# Patient Record
Sex: FEMALE | Race: WHITE | NOT HISPANIC OR LATINO | Employment: FULL TIME | URBAN - METROPOLITAN AREA
[De-identification: names, ages, dates, MRNs, and addresses within clinical notes are randomized per-mention and may not be internally consistent; named-entity substitution may affect disease eponyms.]

---

## 2018-09-05 ENCOUNTER — HOSPITAL ENCOUNTER (OUTPATIENT)
Facility: HOSPITAL | Age: 68
Discharge: HOME | End: 2018-09-07
Attending: EMERGENCY MEDICINE | Admitting: EMERGENCY MEDICINE

## 2018-09-05 ENCOUNTER — APPOINTMENT (OUTPATIENT)
Dept: GENERAL RADIOLOGY | Facility: HOSPITAL | Age: 68
End: 2018-09-05

## 2018-09-05 DIAGNOSIS — R07.9 CHEST PAIN, UNSPECIFIED TYPE: Primary | ICD-10-CM

## 2018-09-05 LAB
ALBUMIN SERPL-MCNC: 3.9 G/DL (ref 3.5–5.2)
ALBUMIN/GLOB SERPL: 1.3 G/DL
ALP SERPL-CCNC: 62 U/L (ref 39–117)
ALT SERPL W P-5'-P-CCNC: 32 U/L (ref 1–33)
ANION GAP SERPL CALCULATED.3IONS-SCNC: 12.2 MMOL/L
AST SERPL-CCNC: 30 U/L (ref 1–32)
BASOPHILS # BLD AUTO: 0.03 10*3/MM3 (ref 0–0.2)
BASOPHILS NFR BLD AUTO: 0.6 % (ref 0–1.5)
BILIRUB SERPL-MCNC: 0.2 MG/DL (ref 0.1–1.2)
BUN BLD-MCNC: 11 MG/DL (ref 8–23)
BUN/CREAT SERPL: 13.9 (ref 7–25)
CALCIUM SPEC-SCNC: 9.3 MG/DL (ref 8.6–10.5)
CHLORIDE SERPL-SCNC: 103 MMOL/L (ref 98–107)
CO2 SERPL-SCNC: 25.8 MMOL/L (ref 22–29)
CREAT BLD-MCNC: 0.79 MG/DL (ref 0.57–1)
DEPRECATED RDW RBC AUTO: 45.1 FL (ref 37–54)
EOSINOPHIL # BLD AUTO: 0.16 10*3/MM3 (ref 0–0.7)
EOSINOPHIL NFR BLD AUTO: 3 % (ref 0.3–6.2)
ERYTHROCYTE [DISTWIDTH] IN BLOOD BY AUTOMATED COUNT: 12.8 % (ref 11.7–13)
GFR SERPL CREATININE-BSD FRML MDRD: 73 ML/MIN/1.73
GLOBULIN UR ELPH-MCNC: 3 GM/DL
GLUCOSE BLD-MCNC: 233 MG/DL (ref 65–99)
HCT VFR BLD AUTO: 41.3 % (ref 35.6–45.5)
HGB BLD-MCNC: 13.1 G/DL (ref 11.9–15.5)
IMM GRANULOCYTES # BLD: 0 10*3/MM3 (ref 0–0.03)
IMM GRANULOCYTES NFR BLD: 0 % (ref 0–0.5)
LIPASE SERPL-CCNC: 37 U/L (ref 13–60)
LYMPHOCYTES # BLD AUTO: 1.97 10*3/MM3 (ref 0.9–4.8)
LYMPHOCYTES NFR BLD AUTO: 37 % (ref 19.6–45.3)
MCH RBC QN AUTO: 30.8 PG (ref 26.9–32)
MCHC RBC AUTO-ENTMCNC: 31.7 G/DL (ref 32.4–36.3)
MCV RBC AUTO: 97.2 FL (ref 80.5–98.2)
MONOCYTES # BLD AUTO: 0.39 10*3/MM3 (ref 0.2–1.2)
MONOCYTES NFR BLD AUTO: 7.3 % (ref 5–12)
NEUTROPHILS # BLD AUTO: 2.78 10*3/MM3 (ref 1.9–8.1)
NEUTROPHILS NFR BLD AUTO: 52.1 % (ref 42.7–76)
PLATELET # BLD AUTO: 173 10*3/MM3 (ref 140–500)
PMV BLD AUTO: 10.8 FL (ref 6–12)
POTASSIUM BLD-SCNC: 4.1 MMOL/L (ref 3.5–5.2)
PROT SERPL-MCNC: 6.9 G/DL (ref 6–8.5)
RBC # BLD AUTO: 4.25 10*6/MM3 (ref 3.9–5.2)
SODIUM BLD-SCNC: 141 MMOL/L (ref 136–145)
TROPONIN T SERPL-MCNC: <0.01 NG/ML (ref 0–0.03)
WBC NRBC COR # BLD: 5.33 10*3/MM3 (ref 4.5–10.7)

## 2018-09-05 PROCEDURE — 80053 COMPREHEN METABOLIC PANEL: CPT | Performed by: PHYSICIAN ASSISTANT

## 2018-09-05 PROCEDURE — 93005 ELECTROCARDIOGRAM TRACING: CPT

## 2018-09-05 PROCEDURE — 84484 ASSAY OF TROPONIN QUANT: CPT | Performed by: PHYSICIAN ASSISTANT

## 2018-09-05 PROCEDURE — 83690 ASSAY OF LIPASE: CPT | Performed by: PHYSICIAN ASSISTANT

## 2018-09-05 PROCEDURE — 85025 COMPLETE CBC W/AUTO DIFF WBC: CPT | Performed by: PHYSICIAN ASSISTANT

## 2018-09-05 PROCEDURE — G0378 HOSPITAL OBSERVATION PER HR: HCPCS

## 2018-09-05 PROCEDURE — 71046 X-RAY EXAM CHEST 2 VIEWS: CPT

## 2018-09-05 PROCEDURE — 93005 ELECTROCARDIOGRAM TRACING: CPT | Performed by: EMERGENCY MEDICINE

## 2018-09-05 PROCEDURE — 99284 EMERGENCY DEPT VISIT MOD MDM: CPT

## 2018-09-05 RX ORDER — ASPIRIN 325 MG
325 TABLET, DELAYED RELEASE (ENTERIC COATED) ORAL DAILY
COMMUNITY

## 2018-09-05 RX ORDER — ATORVASTATIN CALCIUM 40 MG/1
40 TABLET, FILM COATED ORAL DAILY
COMMUNITY

## 2018-09-05 RX ORDER — GLIMEPIRIDE 4 MG/1
4 TABLET ORAL
Status: ON HOLD | COMMUNITY
End: 2018-09-06

## 2018-09-05 RX ORDER — CARVEDILOL 6.25 MG/1
6.25 TABLET ORAL DAILY
COMMUNITY

## 2018-09-05 RX ORDER — SODIUM CHLORIDE 0.9 % (FLUSH) 0.9 %
10 SYRINGE (ML) INJECTION AS NEEDED
Status: DISCONTINUED | OUTPATIENT
Start: 2018-09-05 | End: 2018-09-07 | Stop reason: HOSPADM

## 2018-09-06 ENCOUNTER — APPOINTMENT (OUTPATIENT)
Dept: NUCLEAR MEDICINE | Facility: HOSPITAL | Age: 68
End: 2018-09-06

## 2018-09-06 LAB
BH CV NUCLEAR PRIOR STUDY: 3
BH CV STRESS BP STAGE 1: NORMAL
BH CV STRESS BP STAGE 2: NORMAL
BH CV STRESS DURATION MIN STAGE 1: 3
BH CV STRESS DURATION MIN STAGE 2: 1
BH CV STRESS DURATION SEC STAGE 1: 0
BH CV STRESS DURATION SEC STAGE 2: 40
BH CV STRESS GRADE STAGE 1: 10
BH CV STRESS GRADE STAGE 2: 12
BH CV STRESS HR STAGE 1: 121
BH CV STRESS HR STAGE 2: 141
BH CV STRESS METS STAGE 1: 5
BH CV STRESS METS STAGE 2: 7.5
BH CV STRESS PROTOCOL 1: NORMAL
BH CV STRESS RECOVERY BP: NORMAL MMHG
BH CV STRESS RECOVERY HR: 83 BPM
BH CV STRESS SPEED STAGE 1: 1.7
BH CV STRESS SPEED STAGE 2: 2.5
BH CV STRESS STAGE 1: 1
BH CV STRESS STAGE 2: 2
CHOLEST SERPL-MCNC: 196 MG/DL (ref 0–200)
CK SERPL-CCNC: 103 U/L (ref 20–180)
DEPRECATED RDW RBC AUTO: 46.4 FL (ref 37–54)
ERYTHROCYTE [DISTWIDTH] IN BLOOD BY AUTOMATED COUNT: 13 % (ref 11.7–13)
GLUCOSE BLDC GLUCOMTR-MCNC: 159 MG/DL (ref 70–130)
GLUCOSE BLDC GLUCOMTR-MCNC: 180 MG/DL (ref 70–130)
GLUCOSE BLDC GLUCOMTR-MCNC: 234 MG/DL (ref 70–130)
HCT VFR BLD AUTO: 40.8 % (ref 35.6–45.5)
HDLC SERPL-MCNC: 63 MG/DL (ref 40–60)
HGB BLD-MCNC: 12.8 G/DL (ref 11.9–15.5)
LDLC SERPL CALC-MCNC: 120 MG/DL (ref 0–100)
LDLC/HDLC SERPL: 1.9 {RATIO}
MAXIMAL PREDICTED HEART RATE: 154 BPM
MCH RBC QN AUTO: 30.7 PG (ref 26.9–32)
MCHC RBC AUTO-ENTMCNC: 31.4 G/DL (ref 32.4–36.3)
MCV RBC AUTO: 97.8 FL (ref 80.5–98.2)
NT-PROBNP SERPL-MCNC: 51.5 PG/ML (ref 5–900)
PERCENT MAX PREDICTED HR: 91.56 %
PLATELET # BLD AUTO: 155 10*3/MM3 (ref 140–500)
PMV BLD AUTO: 11.1 FL (ref 6–12)
RBC # BLD AUTO: 4.17 10*6/MM3 (ref 3.9–5.2)
STRESS BASELINE BP: NORMAL MMHG
STRESS BASELINE HR: 58 BPM
STRESS PERCENT HR: 108 %
STRESS POST ESTIMATED WORKLOAD: 5.9 METS
STRESS POST EXERCISE DUR MIN: 4 MIN
STRESS POST EXERCISE DUR SEC: 40 SEC
STRESS POST PEAK BP: NORMAL MMHG
STRESS POST PEAK HR: 141 BPM
STRESS TARGET HR: 131 BPM
TRIGL SERPL-MCNC: 67 MG/DL (ref 0–150)
TROPONIN T SERPL-MCNC: <0.01 NG/ML (ref 0–0.03)
VLDLC SERPL-MCNC: 13.4 MG/DL (ref 5–40)
WBC NRBC COR # BLD: 4.8 10*3/MM3 (ref 4.5–10.7)

## 2018-09-06 PROCEDURE — G0378 HOSPITAL OBSERVATION PER HR: HCPCS

## 2018-09-06 PROCEDURE — 0 TECHNETIUM SESTAMIBI: Performed by: INTERNAL MEDICINE

## 2018-09-06 PROCEDURE — 93005 ELECTROCARDIOGRAM TRACING: CPT | Performed by: INTERNAL MEDICINE

## 2018-09-06 PROCEDURE — 93017 CV STRESS TEST TRACING ONLY: CPT

## 2018-09-06 PROCEDURE — 82962 GLUCOSE BLOOD TEST: CPT

## 2018-09-06 PROCEDURE — 85027 COMPLETE CBC AUTOMATED: CPT | Performed by: INTERNAL MEDICINE

## 2018-09-06 PROCEDURE — 80061 LIPID PANEL: CPT | Performed by: INTERNAL MEDICINE

## 2018-09-06 PROCEDURE — 83880 ASSAY OF NATRIURETIC PEPTIDE: CPT | Performed by: INTERNAL MEDICINE

## 2018-09-06 PROCEDURE — 84484 ASSAY OF TROPONIN QUANT: CPT | Performed by: INTERNAL MEDICINE

## 2018-09-06 PROCEDURE — A9500 TC99M SESTAMIBI: HCPCS | Performed by: INTERNAL MEDICINE

## 2018-09-06 PROCEDURE — 78452 HT MUSCLE IMAGE SPECT MULT: CPT

## 2018-09-06 PROCEDURE — 82550 ASSAY OF CK (CPK): CPT | Performed by: INTERNAL MEDICINE

## 2018-09-06 RX ORDER — CARVEDILOL 6.25 MG/1
6.25 TABLET ORAL 2 TIMES DAILY WITH MEALS
Status: DISCONTINUED | OUTPATIENT
Start: 2018-09-06 | End: 2018-09-07 | Stop reason: HOSPADM

## 2018-09-06 RX ORDER — ATORVASTATIN CALCIUM 20 MG/1
40 TABLET, FILM COATED ORAL DAILY
Status: DISCONTINUED | OUTPATIENT
Start: 2018-09-06 | End: 2018-09-07 | Stop reason: HOSPADM

## 2018-09-06 RX ORDER — ASPIRIN 325 MG
325 TABLET, DELAYED RELEASE (ENTERIC COATED) ORAL DAILY
Status: DISCONTINUED | OUTPATIENT
Start: 2018-09-06 | End: 2018-09-07 | Stop reason: HOSPADM

## 2018-09-06 RX ORDER — ASPIRIN 81 MG/1
81 TABLET, CHEWABLE ORAL DAILY
Status: DISCONTINUED | OUTPATIENT
Start: 2018-09-06 | End: 2018-09-06

## 2018-09-06 RX ORDER — CARVEDILOL 6.25 MG/1
6.25 TABLET ORAL EVERY 12 HOURS SCHEDULED
Status: DISCONTINUED | OUTPATIENT
Start: 2018-09-06 | End: 2018-09-06

## 2018-09-06 RX ORDER — CARVEDILOL 6.25 MG/1
6.25 TABLET ORAL DAILY
Status: DISCONTINUED | OUTPATIENT
Start: 2018-09-06 | End: 2018-09-06

## 2018-09-06 RX ORDER — ONDANSETRON 2 MG/ML
4 INJECTION INTRAMUSCULAR; INTRAVENOUS EVERY 6 HOURS PRN
Status: DISCONTINUED | OUTPATIENT
Start: 2018-09-06 | End: 2018-09-07 | Stop reason: HOSPADM

## 2018-09-06 RX ORDER — ATORVASTATIN CALCIUM 20 MG/1
40 TABLET, FILM COATED ORAL NIGHTLY
Status: DISCONTINUED | OUTPATIENT
Start: 2018-09-06 | End: 2018-09-06

## 2018-09-06 RX ORDER — ACETAMINOPHEN 325 MG/1
650 TABLET ORAL EVERY 6 HOURS PRN
Status: DISCONTINUED | OUTPATIENT
Start: 2018-09-06 | End: 2018-09-07 | Stop reason: HOSPADM

## 2018-09-06 RX ADMIN — TECHNETIUM TC 99M SESTAMIBI 1 DOSE: 1 INJECTION INTRAVENOUS at 12:40

## 2018-09-06 RX ADMIN — CARVEDILOL 6.25 MG: 6.25 TABLET, FILM COATED ORAL at 15:15

## 2018-09-06 RX ADMIN — TECHNETIUM TC 99M SESTAMIBI 1 DOSE: 1 INJECTION INTRAVENOUS at 11:00

## 2018-09-06 RX ADMIN — CARVEDILOL 6.25 MG: 6.25 TABLET, FILM COATED ORAL at 21:37

## 2018-09-06 RX ADMIN — ATORVASTATIN CALCIUM 40 MG: 20 TABLET, FILM COATED ORAL at 15:19

## 2018-09-06 RX ADMIN — ASPIRIN 81 MG: 81 TABLET, CHEWABLE ORAL at 09:03

## 2018-09-06 RX ADMIN — ASPIRIN 325 MG: 325 TABLET, DELAYED RELEASE ORAL at 15:16

## 2018-09-06 NOTE — ED NOTES
Pt reports that her chest has hurt for a week that is getting progressively worse. Hx of stent placement. MD Dolan. Reassurance given; call light in reach. Pts breathing even and unlabored. Pt appears in NAD at this time. Family at bedside.        Chloé Robbins RN  09/05/18 5422

## 2018-09-06 NOTE — PLAN OF CARE
Problem: Patient Care Overview  Goal: Plan of Care Review  Outcome: Ongoing (interventions implemented as appropriate)   09/06/18 0241   Coping/Psychosocial   Plan of Care Reviewed With patient   Plan of Care Review   Progress no change   OTHER   Outcome Summary AOx 4. No c/o of pain. NPO for possible stress test. Up adlib. VSS. NSR on the monitor. WC     Goal: Individualization and Mutuality  Outcome: Ongoing (interventions implemented as appropriate)    Goal: Discharge Needs Assessment  Outcome: Ongoing (interventions implemented as appropriate)    Goal: Interprofessional Rounds/Family Conf  Outcome: Ongoing (interventions implemented as appropriate)      Problem: Pain, Acute (Adult)  Goal: Identify Related Risk Factors and Signs and Symptoms  Outcome: Ongoing (interventions implemented as appropriate)    Goal: Acceptable Pain Control/Comfort Level  Outcome: Ongoing (interventions implemented as appropriate)   09/06/18 0241   Pain, Acute (Adult)   Acceptable Pain Control/Comfort Level making progress toward outcome

## 2018-09-06 NOTE — ED PROVIDER NOTES
"EMERGENCY DEPARTMENT ENCOUNTER    Room number:  21/21  Date Seen:  9/5/2018  PCP:  Alena Paz MD   Cardiologist: Dr. Dolan  Historian: Patient, Family      HPI:  Pt has h/o stent placement. Pt presents to the ED c/o intermittent episodes of \"heavy\" sternal chest pain radiating to the left arm onset about a week ago. Pt reports that her episodes of chest pain have increased in frequency since initial onset. Pt's chest pain worsens with exertion and improves with rest. Pt states that her chest pain will occasionally improve with taking ASA. Pt reports that she has also had dyspnea and fatigue, but denies diaphoresis, palpitations, nausea, vomiting, abdominal pain, new/worsening BLE edema, and bilateral/unilateral calf pain. Pt has family h/o cardiac disease <age 65. Pt has taken ASA today. There are no other complaints at this time.           PHYSICAL EXAM    GENERAL: Pt is alert and oriented x3. Pt is not distressed.  HENT: Moist mucous membranes.   EYES: PERRL. EOMs intact.   CV: Regular rhythm, regular rate  RESPIRATORY: Normal effort. No respiratory distress. Lungs are CTAB.   ABDOMEN: Soft and nontender.   MUSCULOSKELETAL: No deformity. No BLE edema.  NEURO: Alert. Pt moves all extremities.   SKIN: Warm, dry.    Vital signs and nursing notes reviewed.            Laboratory Results:  Recent Results (from the past 24 hour(s))   Comprehensive Metabolic Panel    Collection Time: 09/05/18 10:22 PM   Result Value Ref Range    Glucose 233 (H) 65 - 99 mg/dL    BUN 11 8 - 23 mg/dL    Creatinine 0.79 0.57 - 1.00 mg/dL    Sodium 141 136 - 145 mmol/L    Potassium 4.1 3.5 - 5.2 mmol/L    Chloride 103 98 - 107 mmol/L    CO2 25.8 22.0 - 29.0 mmol/L    Calcium 9.3 8.6 - 10.5 mg/dL    Total Protein 6.9 6.0 - 8.5 g/dL    Albumin 3.90 3.50 - 5.20 g/dL    ALT (SGPT) 32 1 - 33 U/L    AST (SGOT) 30 1 - 32 U/L    Alkaline Phosphatase 62 39 - 117 U/L    Total Bilirubin 0.2 0.1 - 1.2 mg/dL    eGFR Non  Amer 73 >60 " mL/min/1.73    Globulin 3.0 gm/dL    A/G Ratio 1.3 g/dL    BUN/Creatinine Ratio 13.9 7.0 - 25.0    Anion Gap 12.2 mmol/L   Lipase    Collection Time: 09/05/18 10:22 PM   Result Value Ref Range    Lipase 37 13 - 60 U/L   Troponin    Collection Time: 09/05/18 10:22 PM   Result Value Ref Range    Troponin T <0.010 0.000 - 0.030 ng/mL   CBC Auto Differential    Collection Time: 09/05/18 10:22 PM   Result Value Ref Range    WBC 5.33 4.50 - 10.70 10*3/mm3    RBC 4.25 3.90 - 5.20 10*6/mm3    Hemoglobin 13.1 11.9 - 15.5 g/dL    Hematocrit 41.3 35.6 - 45.5 %    MCV 97.2 80.5 - 98.2 fL    MCH 30.8 26.9 - 32.0 pg    MCHC 31.7 (L) 32.4 - 36.3 g/dL    RDW 12.8 11.7 - 13.0 %    RDW-SD 45.1 37.0 - 54.0 fl    MPV 10.8 6.0 - 12.0 fL    Platelets 173 140 - 500 10*3/mm3    Neutrophil % 52.1 42.7 - 76.0 %    Lymphocyte % 37.0 19.6 - 45.3 %    Monocyte % 7.3 5.0 - 12.0 %    Eosinophil % 3.0 0.3 - 6.2 %    Basophil % 0.6 0.0 - 1.5 %    Immature Grans % 0.0 0.0 - 0.5 %    Neutrophils, Absolute 2.78 1.90 - 8.10 10*3/mm3    Lymphocytes, Absolute 1.97 0.90 - 4.80 10*3/mm3    Monocytes, Absolute 0.39 0.20 - 1.20 10*3/mm3    Eosinophils, Absolute 0.16 0.00 - 0.70 10*3/mm3    Basophils, Absolute 0.03 0.00 - 0.20 10*3/mm3    Immature Grans, Absolute 0.00 0.00 - 0.03 10*3/mm3         I have reviewed the above studies.          Radiology:  XR Chest 2 View   Preliminary Result   Blunting of the left costophrenic angle may be due to a tiny effusion or   atelectasis. Otherwise no consolidation.                      I have reviewed the above studies.        EKG:       EKG time: 20:25  Rhythm/Rate: NSR rate 78  P waves and NC: Normal P waves  QRS, axis: Normal QRS   ST and T waves: Normal ST     Interpreted Contemporaneously by me, independently viewed  No old EKG is available for comparison          Medications ordered in ED:  Medications   sodium chloride 0.9 % flush 10 mL (not administered)             Progress and Consult Notes:  ED Course as of  Sep 06 0036   Wed Sep 05, 2018   2352 11:52 PM  Patient here for chest pain.  Pain is exertional and associated with left arm radiation, shortness of breath.  Somewhat reminiscent of prior symptoms however that was more fatigue with exertion.  Taken an aspirin.  Discussed with Dr. Nicolas.  Will admit   [SL]      ED Course User Index  [SL] Sen Givens MD       11:14 PM:  Informed pt that her troponin is negative. Will discuss further course of care with the cardiologist. Pt agrees with plan.     Placed call to [facility] to discuss further course of care.     11:18 PM:  Discussed case with Dr. Nicolas, cardiologist. He will admit pt to a telemetry bed for further cardiac workup -> Pt and family were informed and they agree with plan. Decision time to admit: Now.                Diagnosis:  Final diagnoses:   Chest pain, unspecified type           Disposition Vitals:  12:32 AM  Latest vital signs   BP- 133/64 HR- 67 Temp- 98.3 °F (36.8 °C) O2 sat- 97%          Disposition:  Pt admitted to telemetry.      ADMISSION    Discussed treatment plan and reason for admission with pt/family and admitting physician.  Pt/family voiced understanding of the plan for admission for further testing/treatment as needed.                 Scribe/Provider attestations:  Documentation assistance provided by Zander Chandler. Information recorded by the scribe was done at my direction and has been verified and validated by me.     MD ATTESTATION NOTE    The PO and I have discussed this patient's history, physical exam, and treatment plan.  I have reviewed the documentation and personally had a face to face interaction with the patient. I affirm the documentation and agree with the treatment and plan.  The attached note describes my personal findings.        Entered by Zander Chandler, acting as scribe for Dr. Tosha MD.          Zander Chandler  09/06/18 0036       Sen Givens MD  09/06/18 0240

## 2018-09-06 NOTE — PLAN OF CARE
Problem: Patient Care Overview  Goal: Plan of Care Review   09/06/18 1738   Coping/Psychosocial   Plan of Care Reviewed With patient   OTHER   Outcome Summary VSS, Had stress test today. Will be NPO after midnight for possible cath. Will continue to monitor.      Goal: Individualization and Mutuality  Outcome: Ongoing (interventions implemented as appropriate)    Goal: Discharge Needs Assessment  Outcome: Ongoing (interventions implemented as appropriate)    Goal: Interprofessional Rounds/Family Conf  Outcome: Ongoing (interventions implemented as appropriate)      Problem: Pain, Acute (Adult)  Goal: Identify Related Risk Factors and Signs and Symptoms  Outcome: Ongoing (interventions implemented as appropriate)      Problem: Cardiac: ACS (Acute Coronary Syndrome) (Adult)  Goal: Signs and Symptoms of Listed Potential Problems Will be Absent, Minimized or Managed (Cardiac: ACS)  Outcome: Ongoing (interventions implemented as appropriate)

## 2018-09-06 NOTE — H&P
Debbie Carrasquillo   67 y.o.  female    LOS: 0 days   Patient Care Team:  Alena Paz MD as PCP - General (Internal Medicine)      Subjective     Interval History: Patient came to the emergency room with chest discomfort    Patient Complaints: For maybe the last week she's had some intermittent discomfort in her chest.  She has a little difficulty describing it but it sounds as though it may be a heavy feeling in the center of her chest.  His discomfort seems to be related to walking.  She said that yesterday she had more frequent episodes of this same type of chest heaviness.  Because of the increased frequency yesterday and her strong family history of heart disease she came to the emergency room for further evaluation.  She's been followed by Dr. Zackery Dloan and actually had an appointment with him for tomorrow.  He says she had a stent number of years ago.  She hasn't had any problems heart wise since that time.  She also becomes short of breath with the chest discomfort.  That at one morning she awoke with some comfort in the left arm and that was not associated with any exertion or with any chest discomfort.  That is the only time she's had any discomfort in the arm.  She says that with exertion this heaviness in her chest does not radiate.  She has a history of hypertension.  At one time she was thought to have diabetes was placed on oral agents but developed low blood sugars according to her history and that had to be discontinued.  She says at the present time she is on no medicine for diabetes and watches her diet for management of that.  She says her sugars at home have been good.  She is on 40 mg of atorvastatin a day for hyperlipidemia and her history of coronary disease.  She takes an aspirin a day.  Review of Systems:   Constitutional: Negative for fever.   HENT: Negative for sore throat.    Eyes: Negative.    Respiratory: Positive for shortness of breath. Negative for cough.    Cardiovascular:  Positive for chest pain.   Gastrointestinal: Negative for abdominal pain, diarrhea and vomiting.   Genitourinary: Negative for dysuria.   Musculoskeletal: Positive for arthralgias (left arm, subsided). Negative for neck pain.   Skin: Negative for rash.   Allergic/Immunologic: Negative.    Neurological: Positive for weakness (left arm, subsided).  She had some problems with sciatica previously but this has resolved Negative for numbness and headaches.   Hematological: Negative.    Psychiatric/Behavioral: Negative.    All other systems reviewed and are negative.    Medication Review:   Current Facility-Administered Medications:   •  acetaminophen (TYLENOL) tablet 650 mg, 650 mg, Oral, Q6H PRN, Roslyn Nicolas MD  •  aspirin chewable tablet 81 mg, 81 mg, Oral, Daily, Roslyn Nicolas MD, 81 mg at 09/06/18 0903  •  atorvastatin (LIPITOR) tablet 40 mg, 40 mg, Oral, Nightly, Roslyn Nicolas MD  •  carvedilol (COREG) tablet 6.25 mg, 6.25 mg, Oral, Q12H, Roslyn Nicolas MD  •  ondansetron (ZOFRAN) injection 4 mg, 4 mg, Intravenous, Q6H PRN, Roslyn Nicolas MD  •  Insert peripheral IV, , , Once **AND** sodium chloride 0.9 % flush 10 mL, 10 mL, Intravenous, PRN, Timmy Friedman III, PA      Objective     Vital Sign Min/Max for last 24 hours  Temp  Min: 97.3 °F (36.3 °C)  Max: 99.2 °F (37.3 °C)   BP  Min: 130/75  Max: 159/84    Pulse  Min: 52  Max: 83     Wt Readings from Last 3 Encounters:   09/06/18 74.2 kg (163 lb 8 oz)        Intake/Output Summary (Last 24 hours) at 09/06/18 1018  Last data filed at 09/06/18 0813   Gross per 24 hour   Intake                0 ml   Output              300 ml   Net             -300 ml     Physical Exam:      General Appearance:    Well developed and well nourished in no acute distress   Head:    Normocephalic, atraumatic   Eyes:            Conjunctivae normal, non icteric, no xanthelasma   Neck:   no carotid bruit, no JVD   Lungs:     Clear to auscultation  bilaterally. No wheezes, rhonchi or rales. No accessory muscle use.     Heart:    Regular rate and rhythm.  Normal S1 and S2. No murmur, no gallop, rub or lift.    Chest Wall:    No abnormalities observed   Abdomen:     Normal bowel sounds, no masses, no organomegaly, soft        non-tender, non-distended, no guarding, no rebound                tenderness   Rectal:     Deferred   Extremities:   No clubbing, cyanosis oredema.     Pulses:   Pulses palpable and equal bilaterally   Skin:   No bleeding, bruising or rash   Neurologic:   awake alert and oriented x3, speech clear and approp, no facial drooping      Monitor:  nsr  Results Review:     I reviewed the patient's new clinical results.    Sodium Sodium   Date Value Ref Range Status   09/05/2018 141 136 - 145 mmol/L Final      Potassium Potassium   Date Value Ref Range Status   09/05/2018 4.1 3.5 - 5.2 mmol/L Final      Chloride Chloride   Date Value Ref Range Status   09/05/2018 103 98 - 107 mmol/L Final      Bicarbonate No results found for: PLASMABICARB   BUN BUN   Date Value Ref Range Status   09/05/2018 11 8 - 23 mg/dL Final      Creatinine Creatinine   Date Value Ref Range Status   09/05/2018 0.79 0.57 - 1.00 mg/dL Final      Calcium Calcium   Date Value Ref Range Status   09/05/2018 9.3 8.6 - 10.5 mg/dL Final      Magnesium No results found for: MG       Results from last 7 days  Lab Units 09/06/18  0445   WBC 10*3/mm3 4.80   HEMOGLOBIN g/dL 12.8   HEMATOCRIT % 40.8   PLATELETS 10*3/mm3 155       Lab Results  Lab Value Date/Time   TROPONINT <0.010 09/06/2018 0445   TROPONINT <0.010 09/05/2018 2222     Lab Results   Component Value Date    CHOL 196 09/06/2018     Lab Results   Component Value Date    HDL 63 (H) 09/06/2018     Lab Results   Component Value Date     (H) 09/06/2018     Lab Results   Component Value Date    TRIG 67 09/06/2018     No components found for: CHOLHDL            Echo EF Estimated  No results found for: ECHOEFEST        Assessment/ Plan  Chest pain suggestive of angina, no evidence of acute myocardial infarction based on EKG or troponin's  History of coronary artery disease and a stent number of years ago  Hypertension  Prediabetes, at one time she was treated with oral agents and developed hypoglycemia and they were discontinued  Strong family history of coronary disease involving her mother a sister and 2 brothers all of whom had heart surgery either in their 50s or 60s  Plan #1 we are going to begin with a Cardiolite stress test and then decide about the need for cardiac catheterization  #2 discussed with the patient's nurse and in detail with the patient.      Ze Diana MD  09/06/18  10:18 AM      Time: 43 min

## 2018-09-06 NOTE — ED NOTES
Pt to Room 21 with c/o mid-sternal chest pain that radiates across her anterior chest wall into her back.  Pt reports that 11 years ago, she had a stent placed by Dr. Dolan.  Pt reports that she took an ASA yesterday with symptom relief, but states that the asa did not help today.  Pt reports that the pain increases when she lays down and is making her out of breath.  Pt is alert and oriented X4, PERRLA, respirations are even and unlabored, chest rise and fall is equal in expansion.  Pt does not appear to be in distress at this time.  Pt denies fever, chills, n/v/d, blurred vision, abdominal pain, loss in control of bowel/bladder, numbness and/or tingling of the extremities at this time.  Bed in low position, call light within reach, side rails up X2.         Janet Rodriguez, RN  09/05/18 7099

## 2018-09-06 NOTE — ED PROVIDER NOTES
EMERGENCY DEPARTMENT ENCOUNTER    CHIEF COMPLAINT  Chief Complaint: Chest pain  History given by: Patient  History limited by: None  Room Number: 2205/1  PMD: Alena Paz MD      HPI:  Pt is a 67 y.o. female who presents complaining of intermittent chest pain which started 1 week ago but worsened today. Pt also SOA, left arm pain and numbness which has subsided, and she notes that the pain went away with ASA yesterday but not today. Pt has hx of stent 10-11 years ago.     Duration:  1 week  Onset: Gradual  Timing: Intermittent  Location: Chest  Radiation: Left arm  Quality: Pain  Intensity/Severity: Moderate  Progression: Unchanged  Associated Symptoms: SOA, left arm pain and numbness which has subsided  Aggravating Factors: None specified  Alleviating Factors: ASA yesterday  Previous Episodes: Pt has hx of stint 10-11 years ago.   Treatment before arrival: Pt notes that the pain went away with ASA yesterday but not today.    PAST MEDICAL HISTORY  Active Ambulatory Problems     Diagnosis Date Noted   • No Active Ambulatory Problems     Resolved Ambulatory Problems     Diagnosis Date Noted   • No Resolved Ambulatory Problems     Past Medical History:   Diagnosis Date   • Cardiac abnormality    • Hyperlipidemia    • Hypertension        PAST SURGICAL HISTORY  History reviewed. No pertinent surgical history.    FAMILY HISTORY  History reviewed. No pertinent family history.    SOCIAL HISTORY  Social History     Social History   • Marital status:      Spouse name: N/A   • Number of children: N/A   • Years of education: N/A     Occupational History   • Not on file.     Social History Main Topics   • Smoking status: Former Smoker   • Smokeless tobacco: Not on file   • Alcohol use Yes   • Drug use: No   • Sexual activity: Not on file     Other Topics Concern   • Not on file     Social History Narrative   • No narrative on file     ALLERGIES  Patient has no known allergies.    REVIEW OF SYSTEMS  Review of  Systems   Constitutional: Negative for fever.   HENT: Negative for sore throat.    Eyes: Negative.    Respiratory: Positive for shortness of breath. Negative for cough.    Cardiovascular: Positive for chest pain.   Gastrointestinal: Negative for abdominal pain, diarrhea and vomiting.   Genitourinary: Negative for dysuria.   Musculoskeletal: Positive for arthralgias (left arm, subsided). Negative for neck pain.   Skin: Negative for rash.   Allergic/Immunologic: Negative.    Neurological: Positive for weakness (left arm, subsided). Negative for numbness and headaches.   Hematological: Negative.    Psychiatric/Behavioral: Negative.    All other systems reviewed and are negative.      PHYSICAL EXAM  ED Triage Vitals   Temp Heart Rate Resp BP SpO2   09/05/18 2021 09/05/18 2021 09/05/18 2021 09/05/18 2133 09/05/18 2021   99.2 °F (37.3 °C) 83 18 130/75 97 %      Temp src Heart Rate Source Patient Position BP Location FiO2 (%)   -- -- -- -- --              Physical Exam   Constitutional: She is oriented to person, place, and time. No distress.   HENT:   Head: Normocephalic and atraumatic.   Eyes: Pupils are equal, round, and reactive to light. EOM are normal.   Neck: Normal range of motion. Neck supple.   Cardiovascular: Normal rate, regular rhythm and normal heart sounds.    No pedal edema   Pulmonary/Chest: Effort normal and breath sounds normal. No respiratory distress. She exhibits no tenderness.   Abdominal: Soft. There is no tenderness. There is no rebound and no guarding.   Musculoskeletal: Normal range of motion. She exhibits no edema.   Neurological: She is alert and oriented to person, place, and time. She has normal sensation and normal strength.   Skin: Skin is warm and dry. No rash noted.   Psychiatric: Mood and affect normal.   Nursing note and vitals reviewed.      LAB RESULTS  Lab Results (last 24 hours)     Procedure Component Value Units Date/Time    CBC & Differential [[id number]] Collected:  09/05/18 2222     Specimen:  Blood Updated:  09/05/18 2235    Narrative:       The following orders were created for panel order CBC & Differential.  Procedure                               Abnormality         Status                     ---------                               -----------         ------                     CBC Auto Differential[[id number]]        Abnormal            Final result                 Please view results for these tests on the individual orders.    Comprehensive Metabolic Panel [[id number]]  (Abnormal) Collected:  09/05/18 2222    Specimen:  Blood Updated:  09/05/18 2257     Glucose 233 (H) mg/dL      BUN 11 mg/dL      Creatinine 0.79 mg/dL      Sodium 141 mmol/L      Potassium 4.1 mmol/L      Chloride 103 mmol/L      CO2 25.8 mmol/L      Calcium 9.3 mg/dL      Total Protein 6.9 g/dL      Albumin 3.90 g/dL      ALT (SGPT) 32 U/L      AST (SGOT) 30 U/L      Alkaline Phosphatase 62 U/L      Total Bilirubin 0.2 mg/dL      eGFR Non African Amer 73 mL/min/1.73      Globulin 3.0 gm/dL      A/G Ratio 1.3 g/dL      BUN/Creatinine Ratio 13.9     Anion Gap 12.2 mmol/L     Lipase [[id number]]  (Normal) Collected:  09/05/18 2222    Specimen:  Blood Updated:  09/05/18 2257     Lipase 37 U/L     Troponin [[id number]]  (Normal) Collected:  09/05/18 2222    Specimen:  Blood Updated:  09/05/18 2257     Troponin T <0.010 ng/mL     Narrative:       Troponin T Reference Ranges:  Less than 0.03 ng/mL:    Negative for AMI  0.03 to 0.09 ng/mL:      Indeterminant for AMI  Greater than 0.09 ng/mL: Positive for AMI    CBC Auto Differential [[id number]]  (Abnormal) Collected:  09/05/18 2222    Specimen:  Blood Updated:  09/05/18 2235     WBC 5.33 10*3/mm3      RBC 4.25 10*6/mm3      Hemoglobin 13.1 g/dL      Hematocrit 41.3 %      MCV 97.2 fL      MCH 30.8 pg      MCHC 31.7 (L) g/dL      RDW 12.8 %      RDW-SD 45.1 fl      MPV 10.8 fL      Platelets 173 10*3/mm3      Neutrophil % 52.1 %      Lymphocyte % 37.0 %      Monocyte % 7.3 %       Eosinophil % 3.0 %      Basophil % 0.6 %      Immature Grans % 0.0 %      Neutrophils, Absolute 2.78 10*3/mm3      Lymphocytes, Absolute 1.97 10*3/mm3      Monocytes, Absolute 0.39 10*3/mm3      Eosinophils, Absolute 0.16 10*3/mm3      Basophils, Absolute 0.03 10*3/mm3      Immature Grans, Absolute 0.00 10*3/mm3     BNP [[id number]]  (Normal) Collected:  09/06/18 0445    Specimen:  Blood Updated:  09/06/18 0610     proBNP 51.5 pg/mL     Narrative:       Among patients with dyspnea, NT-proBNP is highly sensitive for the detection of acute congestive heart failure. In addition NT-proBNP of <300 pg/ml effectively rules out acute congestive heart failure with 99% negative predictive value.    CBC (No Diff) [[id number]]  (Abnormal) Collected:  09/06/18 0445    Specimen:  Blood Updated:  09/06/18 0543     WBC 4.80 10*3/mm3      RBC 4.17 10*6/mm3      Hemoglobin 12.8 g/dL      Hematocrit 40.8 %      MCV 97.8 fL      MCH 30.7 pg      MCHC 31.4 (L) g/dL      RDW 13.0 %      RDW-SD 46.4 fl      MPV 11.1 fL      Platelets 155 10*3/mm3     CK [[id number]]  (Normal) Collected:  09/06/18 0445    Specimen:  Blood Updated:  09/06/18 0609     Creatine Kinase 103 U/L     Lipid Panel [[id number]]  (Abnormal) Collected:  09/06/18 0445    Specimen:  Blood Updated:  09/06/18 0609     Total Cholesterol 196 mg/dL      Triglycerides 67 mg/dL      HDL Cholesterol 63 (H) mg/dL      LDL Cholesterol  120 (H) mg/dL      VLDL Cholesterol 13.4 mg/dL      LDL/HDL Ratio 1.90    Narrative:       Cholesterol Reference Ranges  (U.S. Department of Health and Human Services ATP III Classifications)    Desirable          <200 mg/dL  Borderline High    200-239 mg/dL  High Risk          >240 mg/dL      Triglyceride Reference Ranges  (U.S. Department of Health and Human Services ATP III Classifications)    Normal           <150 mg/dL  Borderline High  150-199 mg/dL  High             200-499 mg/dL  Very High        >500 mg/dL    HDL Reference Ranges  (U.S.  Department of Health and Human Services ATP III Classifcations)    Low     <40 mg/dl (major risk factor for CHD)  High    >60 mg/dl ('negative' risk factor for CHD)        LDL Reference Ranges  (U.S. Department of Health and Human Services ATP III Classifcations)    Optimal          <100 mg/dL  Near Optimal     100-129 mg/dL  Borderline High  130-159 mg/dL  High             160-189 mg/dL  Very High        >189 mg/dL    Troponin [[id number]]  (Normal) Collected:  09/06/18 0445    Specimen:  Blood Updated:  09/06/18 0610     Troponin T <0.010 ng/mL     Narrative:       Troponin T Reference Ranges:  Less than 0.03 ng/mL:    Negative for AMI  0.03 to 0.09 ng/mL:      Indeterminant for AMI  Greater than 0.09 ng/mL: Positive for AMI        I ordered the above labs and reviewed the results    RADIOLOGY  XR Chest 2 View   Preliminary Result   Blunting of the left costophrenic angle may be due to a tiny effusion or   atelectasis. Otherwise no consolidation.                 I ordered the above noted radiological studies. Interpreted by radiologist. Reviewed by me in PACS.     PROCEDURES  Procedures    EKG           EKG time: 2025  Rhythm/Rate: Sinus 78  P waves and NV: NML  QRS, axis: NML   ST and T waves: Unremarkable St segments, borderline T wave abnormalities    Interpreted Contemporaneously by me, independently viewed  No old for comparison    PROGRESS AND CONSULTS  ED Course as of Sep 06 0627   Wed Sep 05, 2018   4762 11:52 PM  Patient here for chest pain.  Pain is exertional and associated with left arm radiation, shortness of breath.  Somewhat reminiscent of prior symptoms however that was more fatigue with exertion.  Taken an aspirin.  Discussed with Dr. Nicolas.  Will admit   [SL]      ED Course User Index  [SL] Sen Givens MD   2208-Checked patient and discussed plan to order labs for further evaluation. Pt understands and agrees with the plan, all questions answered.    2205-Ordered blood work, CXR, IVF for  hydration, and EKG.     2240-Patient care turned over to Dr. Givens.    MEDICAL DECISION MAKING  Results were reviewed/discussed with the patient and they were also made aware of online access. Pt also made aware that some labs, such as cultures, will not be resulted during ER visit and follow up with PMD is necessary.     MDM  Number of Diagnoses or Management Options     Amount and/or Complexity of Data Reviewed  Clinical lab tests: ordered and reviewed  Tests in the radiology section of CPT®: ordered and reviewed (CXR shows)  Tests in the medicine section of CPT®: ordered and reviewed (See EKG procedure note.)  Discuss the patient with other providers: yes (Dr. Givens)    Patient Progress  Patient progress: stable         DIAGNOSIS  Final diagnoses:   Chest pain, unspecified type       DISPOSITION  Patient care turned over.     Latest Documented Vital Signs:  As of 6:27 AM  BP- 150/91 HR- 63 Temp- 98.2 °F (36.8 °C) (Oral) O2 sat- 98%    --  Documentation assistance provided by zeferino Ferrer for Ravi Friedman.  Information recorded by the scribe was done at my direction and has been verified and validated by me.     Deborah Ferrer  09/05/18 2248       Timmy Friedman III, PA  09/06/18 2682

## 2018-09-06 NOTE — PROGRESS NOTES
Discharge Planning Assessment  [facility]     Patient Name: Debbie Carrasquillo  MRN: [id number]  Today's Date: 9/6/2018    Admit Date: 9/5/2018          Discharge Needs Assessment     Row Name 09/06/18 1105       Living Environment    Lives With alone    Current Living Arrangements home/apartment/condo    Primary Care Provided by self    Able to Return to Prior Arrangements yes       Resource/Environmental Concerns    Transportation Concerns car, none       Transition Planning    Patient/Family Anticipated Services at Transition none    Transportation Anticipated family or friend will provide       Discharge Needs Assessment    Readmission Within the Last 30 Days no previous admission in last 30 days    Concerns to be Addressed no discharge needs identified    Equipment Currently Used at Home none    Equipment Needed After Discharge none            Discharge Plan     Row Name 09/06/18 1106       Plan    Plan Home denies any discharge needs    Plan Comments Spoke with patient at bedside, face sheet verified. Patient denies any discharge needs, plans to return home at discharge. Tiffanie Hill RN        Destination     No service coordination in this encounter.      Durable Medical Equipment     No service coordination in this encounter.      Dialysis/Infusion     No service coordination in this encounter.      Home Medical Care     No service coordination in this encounter.      Social Care     No service coordination in this encounter.                Demographic Summary     Row Name 09/06/18 1104       General Information    Admission Type observation    Arrived From emergency department    Referral Source admission list    Reason for Consult discharge planning    Preferred Language English            Functional Status     Row Name 09/06/18 1104       Functional Status    Usual Activity Tolerance good    Current Activity Tolerance good       Functional Status, IADL    Medications independent    Meal Preparation  independent    Housekeeping independent    Laundry independent    Shopping independent            Psychosocial    No documentation.           Abuse/Neglect    No documentation.           Legal    No documentation.           Substance Abuse    No documentation.           Patient Forms    No documentation.         Tiffanie Hill RN

## 2018-09-07 VITALS
TEMPERATURE: 97.5 F | WEIGHT: 163.5 LBS | DIASTOLIC BLOOD PRESSURE: 53 MMHG | RESPIRATION RATE: 18 BRPM | BODY MASS INDEX: 30.09 KG/M2 | HEART RATE: 56 BPM | OXYGEN SATURATION: 96 % | SYSTOLIC BLOOD PRESSURE: 100 MMHG | HEIGHT: 62 IN

## 2018-09-07 LAB
ALBUMIN SERPL-MCNC: 3.9 G/DL (ref 3.5–5.2)
ALBUMIN/GLOB SERPL: 1.6 G/DL
ALP SERPL-CCNC: 54 U/L (ref 39–117)
ALT SERPL W P-5'-P-CCNC: 34 U/L (ref 1–33)
ANION GAP SERPL CALCULATED.3IONS-SCNC: 11.7 MMOL/L
AST SERPL-CCNC: 32 U/L (ref 1–32)
BILIRUB SERPL-MCNC: 0.5 MG/DL (ref 0.1–1.2)
BUN BLD-MCNC: 17 MG/DL (ref 8–23)
BUN/CREAT SERPL: 20 (ref 7–25)
CALCIUM SPEC-SCNC: 9 MG/DL (ref 8.6–10.5)
CHLORIDE SERPL-SCNC: 105 MMOL/L (ref 98–107)
CO2 SERPL-SCNC: 23.3 MMOL/L (ref 22–29)
CREAT BLD-MCNC: 0.85 MG/DL (ref 0.57–1)
GFR SERPL CREATININE-BSD FRML MDRD: 67 ML/MIN/1.73
GLOBULIN UR ELPH-MCNC: 2.5 GM/DL
GLUCOSE BLD-MCNC: 135 MG/DL (ref 65–99)
GLUCOSE BLDC GLUCOMTR-MCNC: 146 MG/DL (ref 70–130)
GLUCOSE BLDC GLUCOMTR-MCNC: 191 MG/DL (ref 70–130)
GLUCOSE BLDC GLUCOMTR-MCNC: 206 MG/DL (ref 70–130)
POTASSIUM BLD-SCNC: 4.3 MMOL/L (ref 3.5–5.2)
PROT SERPL-MCNC: 6.4 G/DL (ref 6–8.5)
SODIUM BLD-SCNC: 140 MMOL/L (ref 136–145)

## 2018-09-07 PROCEDURE — 25010000002 FENTANYL CITRATE (PF) 100 MCG/2ML SOLUTION: Performed by: INTERNAL MEDICINE

## 2018-09-07 PROCEDURE — C1769 GUIDE WIRE: HCPCS | Performed by: INTERNAL MEDICINE

## 2018-09-07 PROCEDURE — 93458 L HRT ARTERY/VENTRICLE ANGIO: CPT | Performed by: INTERNAL MEDICINE

## 2018-09-07 PROCEDURE — G0378 HOSPITAL OBSERVATION PER HR: HCPCS

## 2018-09-07 PROCEDURE — 25010000002 MIDAZOLAM PER 1 MG: Performed by: INTERNAL MEDICINE

## 2018-09-07 PROCEDURE — 82962 GLUCOSE BLOOD TEST: CPT

## 2018-09-07 PROCEDURE — C1894 INTRO/SHEATH, NON-LASER: HCPCS | Performed by: INTERNAL MEDICINE

## 2018-09-07 PROCEDURE — 99152 MOD SED SAME PHYS/QHP 5/>YRS: CPT | Performed by: INTERNAL MEDICINE

## 2018-09-07 PROCEDURE — 80053 COMPREHEN METABOLIC PANEL: CPT | Performed by: INTERNAL MEDICINE

## 2018-09-07 PROCEDURE — 0 IOPAMIDOL PER 1 ML: Performed by: INTERNAL MEDICINE

## 2018-09-07 PROCEDURE — 25010000002 HEPARIN (PORCINE) PER 1000 UNITS: Performed by: INTERNAL MEDICINE

## 2018-09-07 RX ORDER — SODIUM CHLORIDE 9 MG/ML
INJECTION, SOLUTION INTRAVENOUS CONTINUOUS PRN
Status: COMPLETED | OUTPATIENT
Start: 2018-09-07 | End: 2018-09-07

## 2018-09-07 RX ORDER — SODIUM CHLORIDE 9 MG/ML
100 INJECTION, SOLUTION INTRAVENOUS CONTINUOUS
Status: DISCONTINUED | OUTPATIENT
Start: 2018-09-07 | End: 2018-09-07 | Stop reason: HOSPADM

## 2018-09-07 RX ORDER — ISOSORBIDE MONONITRATE 30 MG/1
15 TABLET, EXTENDED RELEASE ORAL
Status: DISCONTINUED | OUTPATIENT
Start: 2018-09-07 | End: 2018-09-07 | Stop reason: HOSPADM

## 2018-09-07 RX ORDER — FENTANYL CITRATE 50 UG/ML
INJECTION, SOLUTION INTRAMUSCULAR; INTRAVENOUS AS NEEDED
Status: DISCONTINUED | OUTPATIENT
Start: 2018-09-07 | End: 2018-09-07 | Stop reason: HOSPADM

## 2018-09-07 RX ORDER — ISOSORBIDE MONONITRATE 30 MG/1
15 TABLET, EXTENDED RELEASE ORAL DAILY
Qty: 15 TABLET | Refills: 6 | Status: SHIPPED | OUTPATIENT
Start: 2018-09-07

## 2018-09-07 RX ORDER — NITROGLYCERIN 0.4 MG/1
0.4 TABLET SUBLINGUAL
Status: DISCONTINUED | OUTPATIENT
Start: 2018-09-07 | End: 2018-09-07 | Stop reason: HOSPADM

## 2018-09-07 RX ORDER — MIDAZOLAM HYDROCHLORIDE 1 MG/ML
INJECTION INTRAMUSCULAR; INTRAVENOUS AS NEEDED
Status: DISCONTINUED | OUTPATIENT
Start: 2018-09-07 | End: 2018-09-07 | Stop reason: HOSPADM

## 2018-09-07 RX ORDER — NITROGLYCERIN 0.4 MG/1
0.4 TABLET SUBLINGUAL
Qty: 25 TABLET | Refills: 12 | Status: SHIPPED | OUTPATIENT
Start: 2018-09-07

## 2018-09-07 RX ORDER — LIDOCAINE HYDROCHLORIDE 10 MG/ML
INJECTION, SOLUTION INFILTRATION; PERINEURAL AS NEEDED
Status: DISCONTINUED | OUTPATIENT
Start: 2018-09-07 | End: 2018-09-07 | Stop reason: HOSPADM

## 2018-09-07 RX ADMIN — ATORVASTATIN CALCIUM 40 MG: 20 TABLET, FILM COATED ORAL at 08:37

## 2018-09-07 RX ADMIN — ASPIRIN 325 MG: 325 TABLET, DELAYED RELEASE ORAL at 08:37

## 2018-09-07 RX ADMIN — CARVEDILOL 6.25 MG: 6.25 TABLET, FILM COATED ORAL at 08:37

## 2018-09-07 NOTE — PROGRESS NOTES
Debbie Carrasquillo   67 y.o.  female    LOS: 0 days   Patient Care Team:  Alena Paz MD as PCP - General (Internal Medicine)      Subjective     Interval History:     Patient Complaints: No current chest discomfort.  Patient says that since she's been in the hospital she's had some very vague discomfort in her chest area.  Nothing as severe as she had prior to admission.  She also tells me that during her stress test she did have some heaviness in her chest.  No shortness of air this morning.  No palpitations or lightheadedness.    Review of Systems:   Constitutional: Negative for fever.   HENT: Negative for sore throat.    Eyes: Negative.    Respiratory: Positive for shortness of breath. Negative for cough.    Cardiovascular: Positive for chest pain.   Gastrointestinal: Negative for abdominal pain, diarrhea and vomiting.   Genitourinary: Negative for dysuria.   Musculoskeletal: Positive for arthralgias (left arm, subsided). Negative for neck pain.   Skin: Negative for rash.   Allergic/Immunologic: Negative.    Neurological: Positive for weakness (left arm, subsided).  She had some problems with sciatica previously but this has resolved Negative for numbness and headaches.   Hematological: Negative.    Psychiatric/Behavioral: Negative.    All other systems reviewed and are negative.    Medication Review:   Current Facility-Administered Medications:   •  acetaminophen (TYLENOL) tablet 650 mg, 650 mg, Oral, Q6H PRN, Roslyn Nicolas MD  •  aspirin EC tablet 325 mg, 325 mg, Oral, Daily, Ze Diana MD, 325 mg at 09/06/18 1516  •  atorvastatin (LIPITOR) tablet 40 mg, 40 mg, Oral, Daily, Ze Diana MD, 40 mg at 09/06/18 1519  •  carvedilol (COREG) tablet 6.25 mg, 6.25 mg, Oral, BID With Meals, Ze Diana MD, 6.25 mg at 09/06/18 2137  •  ondansetron (ZOFRAN) injection 4 mg, 4 mg, Intravenous, Q6H PRN, Roslyn Nicolas MD  •  Insert peripheral IV, , , Once **AND** sodium  chloride 0.9 % flush 10 mL, 10 mL, Intravenous, PRN, Timmy Friedman III, PA      Objective     Vital Sign Min/Max for last 24 hours  Temp  Min: 97.7 °F (36.5 °C)  Max: 98.3 °F (36.8 °C)   BP  Min: 101/67  Max: 138/75    Pulse  Min: 54  Max: 78     Wt Readings from Last 3 Encounters:   09/06/18 74.2 kg (163 lb 8 oz)        Intake/Output Summary (Last 24 hours) at 09/07/18 0835  Last data filed at 09/06/18 1828   Gross per 24 hour   Intake              600 ml   Output             1000 ml   Net             -400 ml     Physical Exam:      General Appearance:    Well developed and well nourished in no acute distress   Head:    Normocephalic, atraumatic   Eyes:            Conjunctivae normal, non icteric, no xanthelasma   Neck:   no carotid bruit, no JVD   Lungs:     Clear to auscultation bilaterally. No wheezes, rhonchi or rales. No accessory muscle use.     Heart:    Regular rate and rhythm.  Normal S1 and S2. No murmur, no gallop, rub or lift.    Chest Wall:    No abnormalities observed   Abdomen:     Normal bowel sounds, no masses, no organomegaly, soft        non-tender, non-distended, no guarding, no rebound                tenderness   Rectal:     Deferred   Extremities:   No clubbing, cyanosis oredema.     Pulses:   Pulses palpable and equal bilaterally   Skin:   No bleeding, bruising or rash   Neurologic:   awake alert and oriented x3, speech clear and approp, no facial drooping      Monitor:  nsr  Results Review:     I reviewed the patient's new clinical results.    Sodium Sodium   Date Value Ref Range Status   09/07/2018 140 136 - 145 mmol/L Final   09/05/2018 141 136 - 145 mmol/L Final      Potassium Potassium   Date Value Ref Range Status   09/07/2018 4.3 3.5 - 5.2 mmol/L Final   09/05/2018 4.1 3.5 - 5.2 mmol/L Final      Chloride Chloride   Date Value Ref Range Status   09/07/2018 105 98 - 107 mmol/L Final   09/05/2018 103 98 - 107 mmol/L Final      Bicarbonate No results found for: PLASMABICARB    BUN BUN   Date Value Ref Range Status   09/07/2018 17 8 - 23 mg/dL Final   09/05/2018 11 8 - 23 mg/dL Final      Creatinine Creatinine   Date Value Ref Range Status   09/07/2018 0.85 0.57 - 1.00 mg/dL Final   09/05/2018 0.79 0.57 - 1.00 mg/dL Final      Calcium Calcium   Date Value Ref Range Status   09/07/2018 9.0 8.6 - 10.5 mg/dL Final   09/05/2018 9.3 8.6 - 10.5 mg/dL Final      Magnesium No results found for: MG       Results from last 7 days  Lab Units 09/06/18  0445   WBC 10*3/mm3 4.80   HEMOGLOBIN g/dL 12.8   HEMATOCRIT % 40.8   PLATELETS 10*3/mm3 155       Lab Results  Lab Value Date/Time   TROPONINT <0.010 09/06/2018 0445   TROPONINT <0.010 09/05/2018 2222     Lab Results   Component Value Date    CHOL 196 09/06/2018     Lab Results   Component Value Date    HDL 63 (H) 09/06/2018     Lab Results   Component Value Date     (H) 09/06/2018     Lab Results   Component Value Date    TRIG 67 09/06/2018     No components found for: CHOLHDL            Echo EF Estimated  No results found for: ECHOEFEST       Assessment/ Plan  Chest pain suggestive of angina, no evidence of acute myocardial infarction based on EKG or troponin's  History of coronary artery disease and a stent number of years ago  Hypertension  Prediabetes, at one time she was treated with oral agents and developed hypoglycemia and they were discontinued  Strong family history of coronary disease involving her mother a sister and 2 brothers all of whom had heart surgery either in their 50s or 60s  Plan #1 I reviewed the stress test report.  The nuclear images reported as normal.  The report indicates the patient had no symptoms during exercise however the patient's telling me this morning that she did have the same heaviness in her chest that she's been having one or maybe a little less severe.  She did have some EKG change and only the exercise time appears to be only 4 minutes on the report.  I think we need to be sure that this is not a  false negative nuclear image.  I think we should proceed with cardiac catheterization in view of her prior symptoms and this strong family history etc.  She also has a known history of coronary disease  and has a previous stent.  We will try to schedule the cardiac catheter for today.  The patient is agreeable to go ahead with this.          Ze Diana MD  09/07/18  8:35 AM      Time: 19 min

## 2018-09-07 NOTE — PROGRESS NOTES
AAO. Diagnostic heart catheterization/PCI/stent procedure risks (including but not limited to: allergy,arrhythmia,bleeding,CVA,MI,renal dysfunction,emergent CABG and death) were explained to patient. All questions were answered. She voiced understanding and agree to proceed with treatment plan.

## 2018-09-07 NOTE — PLAN OF CARE
Problem: Patient Care Overview  Goal: Plan of Care Review  Outcome: Ongoing (interventions implemented as appropriate)   09/07/18 6289   Coping/Psychosocial   Plan of Care Reviewed With patient   Plan of Care Review   Progress improving   OTHER   Outcome Summary Denies pain. VSS. Possible heart cath today. NPO currentlly. Continue to monitor.        Problem: Pain, Acute (Adult)  Goal: Acceptable Pain Control/Comfort Level  Outcome: Ongoing (interventions implemented as appropriate)      Problem: Cardiac: ACS (Acute Coronary Syndrome) (Adult)  Goal: Signs and Symptoms of Listed Potential Problems Will be Absent, Minimized or Managed (Cardiac: ACS)  Outcome: Ongoing (interventions implemented as appropriate)

## 2018-09-07 NOTE — DISCHARGE SUMMARY
Date of Discharge:  9/7/2018    Discharge Diagnosis: Chest pain, coronary artery disease    Presenting Problem/History of Present Illness  Chest pain, unspecified type [R07.9]  Chest pain, unspecified type [R07.9]     Patient Active Problem List   Diagnosis   • Chest pain            Hospital Course  Patient is a 67 y.o. female presented with chest pain.  This was a heaviness in her chest primarily related to exertion.  She felt that this had increased in frequency and severity the day of admission and therefore she came to the emergency room.  She ruled out for acute myocardial infarction.  We did a nuclear study and she had chest heaviness during the treadmill and some EKG changes but the nuclear image was reported as normal.  In view of her extremely strong family history coronary disease and other risk factors we did proceed with cardiac catheterization.  She had a 70% proximal right coronary artery stenosis but this was a nondominant very small vessel.  As she had some luminal irregularities in the circumflex and LAD.  There were some branches of the LAD had 20-30% stenosis.  Left main was normal.  On the coronary anatomy was felt that medical therapy was indicated.  Following the cardiac catheter she is fine.  This was a radial procedure.  She is stable and it's felt she could be discharged with outpatient follow-up.  Her LDL was high here at 120 and those she is on 40 mg of atorvastatin a day.  She'll need a repeat lipid profile as an outpatient to determine other therapy for that if it remains at elevated.  .  Her blood pressures a little low at the time of discharge 100/53.  Therefore this limits our medical therapy somewhat.  We will continue the the lower dose carvedilol.  I'm adding 15 mg of isosorbide.  Will have some sublingual nitroglycerin for when necessary use.  She'll continue the aspirin 325 daily  Procedures Performed  Procedure(s):  Left Heart Cath       Consults:   Consults     Date and Time  Order Name Status Description    [place]5/2018 [place] [facility] [place] unless specified[place]           Pertinent Test Results: Cardiac catheter    Ejection Fraction  Normal LV gram on cardiac catheterization and normal LVEF on nuclear study    Condition on Discharge:  Stable    Physical Exam at Discharge    Vital Signs  Temp:  [97.5 °F (36.4 °C)-98.3 °F (36.8 °C)] 97.5 °F (36.4 °C)  Heart Rate:  [49-74] 56  Resp:  [16-18] 18  BP: (100-135)/(47-72) 100/53  Wt Readings from Last 4 Encounters:   09/06/18 74.2 kg (163 lb 8 oz)      General Appearance:    Alert, cooperative, in no acute distress   Head:    Normocephalic, without obvious abnormality, atraumatic   Eyes:            Conjunctivae normal, no   icterus   Neck:   No adenopathy, supple, trachea midline, no thyromegaly, no   carotid bruit, no JVD   Lungs:     Clear to auscultation,respirations regular, even and                  unlabored    Heart:     Regular rhythm and normal rate, normal S1 and S2,            No murmur, no gallop, no rub, no click   Chest Wall:    No abnormalities observed   Abdomen:     Normal bowel sounds, no masses, no organomegaly, soft        non-tender, non-distended, no guarding, no rebound                tenderness   Rectal:     Deferred   Extremities:   No edema. Moves all extremities well, no cyanosis, no erythema   Pulses:   Pulses palpable and equal bilaterally   Skin:   No bleeding, bruising or rash   Neurologic:   Cranial nerves 2 - 12 grossly intact, sensation intact, DTR       present and equal bilaterally          Discharge Disposition home      Discharge Medications     Discharge Medications      ASK your doctor about these medications      Instructions Start Date   aspirin  MG tablet   325 mg, Oral, Daily      atorvastatin 40 MG tablet  Commonly known as:  LIPITOR   40 mg, Oral, Daily      carvedilol 6.25 MG tablet  Commonly known as:  COREG   6.25 mg, Oral, Daily             Discharge Diet:  healthy heart, consistent  carbohydrate    Activity at Discharge:  gradually resume usual activities.  She's been instructed and how to handle the right radial cath site     Follow-up Appointments  Patient of Dr. Barrett fallon and she was advised see him in 2-4 weeks  Follow-up with primary care physician.  Will need repeat lipid profile at some point       Test Results at Discharge  Lab Results   Component Value Date    GLUCOSE 135 (H) 09/07/2018    CALCIUM 9.0 09/07/2018     09/07/2018    K 4.3 09/07/2018    CO2 23.3 09/07/2018     09/07/2018    BUN 17 09/07/2018    CREATININE 0.85 09/07/2018    EGFRIFNONA 67 09/07/2018    BCR 20.0 09/07/2018    ANIONGAP 11.7 09/07/2018        Lab Results   Component Value Date    GLUCOSE 135 (H) 09/07/2018    BUN 17 09/07/2018    CREATININE 0.85 09/07/2018    EGFRIFNONA 67 09/07/2018    BCR 20.0 09/07/2018    CO2 23.3 09/07/2018    CALCIUM 9.0 09/07/2018    ALBUMIN 3.90 09/07/2018    AST 32 09/07/2018    ALT 34 (H) 09/07/2018        Troponin T   Date Value Ref Range Status   09/06/2018 <0.010 0.000 - 0.030 ng/mL Final       Lab Results   Component Value Date    WBC 4.80 09/06/2018    HGB 12.8 09/06/2018    HCT 40.8 09/06/2018    MCV 97.8 09/06/2018     09/06/2018      Lab Results   Component Value Date    CHOL 196 09/06/2018     Lab Results   Component Value Date    HDL 63 (H) 09/06/2018     Lab Results   Component Value Date     (H) 09/06/2018     Lab Results   Component Value Date    TRIG 67 09/06/2018     No components found for: CHOLHDL   No results found for: HGBA1C   No results found for: TSH        Ze Diana MD  09/07/18  4:20 PM    Time: 30 min

## 2018-09-07 NOTE — CONSULTS
"Met with patient, discussed benefits of cardiac rehab. Provided phase II information packet, which includes; general information about cardiac rehab, [facility] handout and [place] [facility] letter article entitled “Cardiac Rehab is often the Best Medicine for Recovery\", stresses the importance of cardiac rehab after a heart event.   I provided the contact information for cardiac rehab here at [facility] and encouraged him to call when discharged.       "

## 2018-09-07 NOTE — PROGRESS NOTES
F/u cath  Awake and alert.  Denies procedure site pain.  Right radial site with dry, intact dressing.  Right brachial 2/right ulnar 2/right radial 2.  No palpable hematoma, no ecchymosis.  Right upper extremity pink, warm with 1 second capillary refill.  I reviewed activity restrictions and site care with patient.  All questions were answered.  She voiced understanding

## 2018-09-10 NOTE — PROGRESS NOTES
Case Management Discharge Note    Final Note: Pt d/c home 9/7/18    Destination     No service has been selected for the patient.      Durable Medical Equipment     No service has been selected for the patient.      Dialysis/Infusion     No service has been selected for the patient.      Home Medical Care     No service has been selected for the patient.      Social Care     No service has been selected for the patient.             Final Discharge Disposition Code: 01 - home or self-care (9/7/18)

## 2018-12-14 ENCOUNTER — OFFICE VISIT (OUTPATIENT)
Dept: CARDIOLOGY | Facility: CLINIC | Age: 68
End: 2018-12-14

## 2018-12-14 VITALS
WEIGHT: 170 LBS | HEIGHT: 62 IN | BODY MASS INDEX: 31.28 KG/M2 | HEART RATE: 61 BPM | SYSTOLIC BLOOD PRESSURE: 161 MMHG | DIASTOLIC BLOOD PRESSURE: 72 MMHG

## 2018-12-14 DIAGNOSIS — I10 HYPERTENSION, UNSPECIFIED TYPE: ICD-10-CM

## 2018-12-14 DIAGNOSIS — I25.118 CORONARY ARTERY DISEASE OF NATIVE HEART WITH STABLE ANGINA PECTORIS, UNSPECIFIED VESSEL OR LESION TYPE (HCC): ICD-10-CM

## 2018-12-14 DIAGNOSIS — R07.9 CHEST PAIN, UNSPECIFIED TYPE: Primary | ICD-10-CM

## 2018-12-14 PROCEDURE — 99213 OFFICE O/P EST LOW 20 MIN: CPT | Performed by: NURSE PRACTITIONER

## 2018-12-14 NOTE — PROGRESS NOTES
Subjective:        Debbie Carrasquillo is a 68 y.o. female who here for follow up    Chief Complaint   Patient presents with   • Follow-up     cath       HPI      Debbie Carrasquillo is a 68 year old who who is new to me.  She presented to ER on 09/05/18 with chest pain and had a left heart catheterization and is here for a follow up appointment after heart catheterization..She denies shortness of breath, chest pain/pressure.    The following portions of the patient's history were reviewed and updated as appropriate: allergies, current medications, past family history, past medical history, past social history, past surgical history and problem list.    Past Medical History:   Diagnosis Date   • Cardiac abnormality     stent placment   • Hyperlipidemia    • Hypertension          reports that she has quit smoking. She does not have any smokeless tobacco history on file. She reports that she drinks alcohol. She reports that she does not use drugs.     History reviewed. No pertinent family history.    ROS     Review of Systems  Constitutional: No wt loss, fever, fatigue  Gastrointestinal: No nausea, abdominal pain  Behavioral/Psych: No insomnia or anxiety  Cardiovascular Denies chest pain/pressure    Objective:           Physical Exam   Constitutional: She is oriented to person, place, and time. She appears well-developed and well-nourished.   HENT:   Head: Normocephalic.   Right Ear: External ear normal.   Left Ear: External ear normal.   Eyes: EOM are normal.   Neck: Normal range of motion. No JVD present.   Cardiovascular: Normal rate, regular rhythm, normal heart sounds and intact distal pulses. Exam reveals no gallop and no friction rub.   No murmur heard.  Pulmonary/Chest: Effort normal and breath sounds normal. No stridor. No respiratory distress. She has no rales.   Abdominal: Soft. Bowel sounds are normal. She exhibits no distension. There is no tenderness. There is no guarding.   Musculoskeletal: Normal range of  motion. She exhibits no edema or tenderness.   Neurological: She is alert and oriented to person, place, and time. She has normal reflexes.   Skin: Skin is warm.   Cardiac catheterization r wrist site soft, no oozing, and no bruising   Psychiatric: She has a normal mood and affect. Judgment normal.   Nursing note and vitals reviewed.       Left heart catheterization 09/07/18  Conclusion        · Successful right and left coronary angiogram and LV gram  · Normal left main  · Left ventricular descending minimum irregularity including the diagonal and  branches at the ostial level 30-40% stenosis  · Circumflex artery was a dominant with minimum diffuse irregularity  · Right coronary artery was a nondominant with ostial 70% stenosis  · Normal LV gram     September 7, 2018     Debbie Carrasquillo     Stress test   09/06/18  Interpretation Summary     · Left ventricular ejection fraction is normal.  · Myocardial perfusion imaging indicates a normal myocardial perfusion study with no evidence of ischemia.                  Current Outpatient Medications:   •  aspirin  MG tablet, Take 325 mg by mouth Daily., Disp: , Rfl:   •  atorvastatin (LIPITOR) 40 MG tablet, Take 40 mg by mouth Daily., Disp: , Rfl:   •  carvedilol (COREG) 6.25 MG tablet, Take 6.25 mg by mouth Daily., Disp: , Rfl:   •  isosorbide mononitrate (IMDUR) 30 MG 24 hr tablet, Take 0.5 tablets by mouth Daily., Disp: 15 tablet, Rfl: 6  •  nitroglycerin (NITROSTAT) 0.4 MG SL tablet, Place 1 tablet under the tongue Every 5 (Five) Minutes As Needed for Chest Pain. Take no more than 3 doses in 15 minutes., Disp: 25 tablet, Rfl: 12     Assessment:        Patient Active Problem List   Diagnosis   • Chest pain               Plan:   1. Chest pain:   2. CAD  3. Hypertension      1. Chest pain:   No chest pain  Post cardiac catheterization:     2. CAD s/p a stent years ago  On statin  Debbie Lopez of the hyperlipidemia, importance of the treatment has been  explained     Pros and cons of the statins has been explained     Regular blood workup as well as side effects including the liver failure, myelopathy death has been explained     09/07/18 Cardiac catheterization: medical treatment  1. Normal left main  2. Left ventricular descending minimum irregularity including the diagonal and  branches at the ostial level 30-40% stenosis  3. Circumflex artery was a dominant with minimum diffuse irregularity  4. Right coronary artery was a nondominant with ostial 70% stenosis     r wrist: Site no oozing no bruising, no hematoma      3. Hypertension: Her BP todays visit sys is 161, however she did not take her morning medications.  She said it always is sys 120's.      Educated her regarding to taking  her medication as directed.       No diagnosis found.    There are no diagnoses linked to this encounter.    COUNSELING:    Debbie Chavira was given to patient for the following topics: diagnostic results, risk factor reductions, impressions, risks and benefits of treatment options and importance of treatment compliance .       SMOKING COUNSELING:    Counseling given: Yes    follow up in 6 months, unless she needs to be seen sooner.    Sincerely,   TREVOR Rosa  [facility]  12/14/18  12:03 PM

## 2021-10-31 ENCOUNTER — HOSPITAL ENCOUNTER (EMERGENCY)
Facility: HOSPITAL | Age: 71
Discharge: HOME | End: 2021-10-31
Attending: EMERGENCY MEDICINE | Admitting: EMERGENCY MEDICINE

## 2021-10-31 ENCOUNTER — APPOINTMENT (OUTPATIENT)
Dept: CT IMAGING | Facility: HOSPITAL | Age: 71
End: 2021-10-31

## 2021-10-31 VITALS
HEART RATE: 71 BPM | OXYGEN SATURATION: 99 % | TEMPERATURE: 97.3 F | DIASTOLIC BLOOD PRESSURE: 71 MMHG | SYSTOLIC BLOOD PRESSURE: 146 MMHG | RESPIRATION RATE: 16 BRPM

## 2021-10-31 DIAGNOSIS — R31.9 HEMATURIA, UNSPECIFIED TYPE: ICD-10-CM

## 2021-10-31 DIAGNOSIS — N39.0 ACUTE UTI: Primary | ICD-10-CM

## 2021-10-31 LAB
ALBUMIN SERPL-MCNC: 4.4 G/DL (ref 3.5–5.2)
ALBUMIN/GLOB SERPL: 1.7 G/DL
ALP SERPL-CCNC: 67 U/L (ref 39–117)
ALT SERPL W P-5'-P-CCNC: 33 U/L (ref 1–33)
ANION GAP SERPL CALCULATED.3IONS-SCNC: 10.3 MMOL/L (ref 5–15)
AST SERPL-CCNC: 32 U/L (ref 1–32)
BACTERIA UR QL AUTO: ABNORMAL /HPF
BASOPHILS # BLD AUTO: 0.04 10*3/MM3 (ref 0–0.2)
BASOPHILS NFR BLD AUTO: 0.5 % (ref 0–1.5)
BILIRUB SERPL-MCNC: 0.5 MG/DL (ref 0–1.2)
BILIRUB UR QL STRIP: NEGATIVE
BILIRUB UR QL STRIP: NEGATIVE
BUN SERPL-MCNC: 11 MG/DL (ref 8–23)
BUN/CREAT SERPL: 12.2 (ref 7–25)
CALCIUM SPEC-SCNC: 9.2 MG/DL (ref 8.6–10.5)
CHLORIDE SERPL-SCNC: 104 MMOL/L (ref 98–107)
CLARITY UR: CLEAR
CLARITY UR: CLEAR
CO2 SERPL-SCNC: 25.7 MMOL/L (ref 22–29)
COLOR UR: YELLOW
COLOR UR: YELLOW
CREAT SERPL-MCNC: 0.9 MG/DL (ref 0.57–1)
DEPRECATED RDW RBC AUTO: 47.3 FL (ref 37–54)
EOSINOPHIL # BLD AUTO: 0.13 10*3/MM3 (ref 0–0.4)
EOSINOPHIL NFR BLD AUTO: 1.6 % (ref 0.3–6.2)
ERYTHROCYTE [DISTWIDTH] IN BLOOD BY AUTOMATED COUNT: 13.3 % (ref 12.3–15.4)
GFR SERPL CREATININE-BSD FRML MDRD: 62 ML/MIN/1.73
GLOBULIN UR ELPH-MCNC: 2.6 GM/DL
GLUCOSE SERPL-MCNC: 238 MG/DL (ref 65–99)
GLUCOSE UR STRIP-MCNC: ABNORMAL MG/DL
GLUCOSE UR STRIP-MCNC: ABNORMAL MG/DL
HCT VFR BLD AUTO: 41.3 % (ref 34–46.6)
HGB BLD-MCNC: 13.5 G/DL (ref 12–15.9)
HGB UR QL STRIP.AUTO: ABNORMAL
HGB UR QL STRIP.AUTO: ABNORMAL
HYALINE CASTS UR QL AUTO: ABNORMAL /LPF
IMM GRANULOCYTES # BLD AUTO: 0.01 10*3/MM3 (ref 0–0.05)
IMM GRANULOCYTES NFR BLD AUTO: 0.1 % (ref 0–0.5)
INR PPP: 1.09 (ref 0.9–1.1)
KETONES UR QL STRIP: ABNORMAL
KETONES UR QL STRIP: ABNORMAL
LEUKOCYTE ESTERASE UR QL STRIP.AUTO: ABNORMAL
LEUKOCYTE ESTERASE UR QL STRIP.AUTO: ABNORMAL
LIPASE SERPL-CCNC: 36 U/L (ref 13–60)
LYMPHOCYTES # BLD AUTO: 1.1 10*3/MM3 (ref 0.7–3.1)
LYMPHOCYTES NFR BLD AUTO: 13.8 % (ref 19.6–45.3)
MAGNESIUM SERPL-MCNC: 2.1 MG/DL (ref 1.6–2.4)
MCH RBC QN AUTO: 31.5 PG (ref 26.6–33)
MCHC RBC AUTO-ENTMCNC: 32.7 G/DL (ref 31.5–35.7)
MCV RBC AUTO: 96.5 FL (ref 79–97)
MONOCYTES # BLD AUTO: 0.56 10*3/MM3 (ref 0.1–0.9)
MONOCYTES NFR BLD AUTO: 7 % (ref 5–12)
NEUTROPHILS NFR BLD AUTO: 6.11 10*3/MM3 (ref 1.7–7)
NEUTROPHILS NFR BLD AUTO: 77 % (ref 42.7–76)
NITRITE UR QL STRIP: NEGATIVE
NITRITE UR QL STRIP: NEGATIVE
NRBC BLD AUTO-RTO: 0 /100 WBC (ref 0–0.2)
PH UR STRIP.AUTO: 7 [PH] (ref 5–8)
PH UR STRIP.AUTO: 7 [PH] (ref 5–8)
PLATELET # BLD AUTO: 163 10*3/MM3 (ref 140–450)
PMV BLD AUTO: 10.8 FL (ref 6–12)
POTASSIUM SERPL-SCNC: 4.4 MMOL/L (ref 3.5–5.2)
PROCALCITONIN SERPL-MCNC: 0.07 NG/ML (ref 0–0.25)
PROT SERPL-MCNC: 7 G/DL (ref 6–8.5)
PROT UR QL STRIP: NEGATIVE
PROT UR QL STRIP: NEGATIVE
PROTHROMBIN TIME: 13.9 SECONDS (ref 11.7–14.2)
RBC # BLD AUTO: 4.28 10*6/MM3 (ref 3.77–5.28)
RBC # UR: ABNORMAL /HPF
REF LAB TEST METHOD: ABNORMAL
SODIUM SERPL-SCNC: 140 MMOL/L (ref 136–145)
SP GR UR STRIP: 1.01 (ref 1–1.03)
SP GR UR STRIP: 1.01 (ref 1–1.03)
SQUAMOUS #/AREA URNS HPF: ABNORMAL /HPF
UROBILINOGEN UR QL STRIP: ABNORMAL
UROBILINOGEN UR QL STRIP: ABNORMAL
WBC # BLD AUTO: 7.95 10*3/MM3 (ref 3.4–10.8)
WBC UR QL AUTO: ABNORMAL /HPF

## 2021-10-31 PROCEDURE — 87086 URINE CULTURE/COLONY COUNT: CPT | Performed by: EMERGENCY MEDICINE

## 2021-10-31 PROCEDURE — 25010000002 IOPAMIDOL 61 % SOLUTION: Performed by: EMERGENCY MEDICINE

## 2021-10-31 PROCEDURE — 84145 PROCALCITONIN (PCT): CPT | Performed by: EMERGENCY MEDICINE

## 2021-10-31 PROCEDURE — 83735 ASSAY OF MAGNESIUM: CPT | Performed by: EMERGENCY MEDICINE

## 2021-10-31 PROCEDURE — 81001 URINALYSIS AUTO W/SCOPE: CPT | Performed by: EMERGENCY MEDICINE

## 2021-10-31 PROCEDURE — P9612 CATHETERIZE FOR URINE SPEC: HCPCS

## 2021-10-31 PROCEDURE — 83690 ASSAY OF LIPASE: CPT | Performed by: EMERGENCY MEDICINE

## 2021-10-31 PROCEDURE — 85610 PROTHROMBIN TIME: CPT | Performed by: EMERGENCY MEDICINE

## 2021-10-31 PROCEDURE — 80053 COMPREHEN METABOLIC PANEL: CPT | Performed by: EMERGENCY MEDICINE

## 2021-10-31 PROCEDURE — 74177 CT ABD & PELVIS W/CONTRAST: CPT

## 2021-10-31 PROCEDURE — 85025 COMPLETE CBC W/AUTO DIFF WBC: CPT | Performed by: EMERGENCY MEDICINE

## 2021-10-31 PROCEDURE — 99283 EMERGENCY DEPT VISIT LOW MDM: CPT

## 2021-10-31 RX ORDER — CEPHALEXIN 250 MG/1
500 CAPSULE ORAL 3 TIMES DAILY
Qty: 30 CAPSULE | Refills: 0 | Status: SHIPPED | OUTPATIENT
Start: 2021-10-31 | End: 2021-11-05

## 2021-10-31 RX ORDER — SODIUM CHLORIDE 0.9 % (FLUSH) 0.9 %
10 SYRINGE (ML) INJECTION AS NEEDED
Status: DISCONTINUED | OUTPATIENT
Start: 2021-10-31 | End: 2021-10-31 | Stop reason: HOSPADM

## 2021-10-31 RX ADMIN — IOPAMIDOL 85 ML: 612 INJECTION, SOLUTION INTRAVENOUS at 14:04

## 2021-10-31 RX ADMIN — SODIUM CHLORIDE 1000 ML: 9 INJECTION, SOLUTION INTRAVENOUS at 12:24

## 2021-11-01 LAB — BACTERIA SPEC AEROBE CULT: NO GROWTH

## 2023-02-04 ENCOUNTER — APPOINTMENT (OUTPATIENT)
Dept: CT IMAGING | Facility: HOSPITAL | Age: 73
End: 2023-02-04
Payer: MEDICARE

## 2023-02-04 ENCOUNTER — HOSPITAL ENCOUNTER (EMERGENCY)
Facility: HOSPITAL | Age: 73
Discharge: HOME | End: 2023-02-04
Attending: EMERGENCY MEDICINE | Admitting: EMERGENCY MEDICINE
Payer: MEDICARE

## 2023-02-04 VITALS
OXYGEN SATURATION: 99 % | TEMPERATURE: 97.8 F | HEIGHT: 62 IN | DIASTOLIC BLOOD PRESSURE: 69 MMHG | WEIGHT: 170 LBS | RESPIRATION RATE: 16 BRPM | SYSTOLIC BLOOD PRESSURE: 144 MMHG | BODY MASS INDEX: 31.28 KG/M2 | HEART RATE: 77 BPM

## 2023-02-04 DIAGNOSIS — N28.1 RENAL CYST: ICD-10-CM

## 2023-02-04 DIAGNOSIS — N30.90 CYSTITIS: Primary | ICD-10-CM

## 2023-02-04 LAB
ANION GAP SERPL CALCULATED.3IONS-SCNC: 7 MMOL/L (ref 5–15)
BACTERIA UR QL AUTO: ABNORMAL /HPF
BASOPHILS # BLD AUTO: 0.04 10*3/MM3 (ref 0–0.2)
BASOPHILS NFR BLD AUTO: 0.5 % (ref 0–1.5)
BILIRUB UR QL STRIP: NEGATIVE
BUN SERPL-MCNC: 13 MG/DL (ref 8–23)
BUN/CREAT SERPL: 14 (ref 7–25)
CALCIUM SPEC-SCNC: 9.2 MG/DL (ref 8.6–10.5)
CHLORIDE SERPL-SCNC: 103 MMOL/L (ref 98–107)
CLARITY UR: ABNORMAL
CO2 SERPL-SCNC: 27 MMOL/L (ref 22–29)
COLOR UR: YELLOW
CREAT SERPL-MCNC: 0.93 MG/DL (ref 0.57–1)
DEPRECATED RDW RBC AUTO: 47.4 FL (ref 37–54)
EGFRCR SERPLBLD CKD-EPI 2021: 65.8 ML/MIN/1.73
EOSINOPHIL # BLD AUTO: 0.13 10*3/MM3 (ref 0–0.4)
EOSINOPHIL NFR BLD AUTO: 1.7 % (ref 0.3–6.2)
ERYTHROCYTE [DISTWIDTH] IN BLOOD BY AUTOMATED COUNT: 13.7 % (ref 12.3–15.4)
GLUCOSE SERPL-MCNC: 114 MG/DL (ref 65–99)
GLUCOSE UR STRIP-MCNC: NEGATIVE MG/DL
HCT VFR BLD AUTO: 39.9 % (ref 34–46.6)
HGB BLD-MCNC: 12.6 G/DL (ref 12–15.9)
HGB UR QL STRIP.AUTO: ABNORMAL
HYALINE CASTS UR QL AUTO: ABNORMAL /LPF
IMM GRANULOCYTES # BLD AUTO: 0.02 10*3/MM3 (ref 0–0.05)
IMM GRANULOCYTES NFR BLD AUTO: 0.3 % (ref 0–0.5)
KETONES UR QL STRIP: NEGATIVE
LEUKOCYTE ESTERASE UR QL STRIP.AUTO: ABNORMAL
LYMPHOCYTES # BLD AUTO: 1.29 10*3/MM3 (ref 0.7–3.1)
LYMPHOCYTES NFR BLD AUTO: 16.9 % (ref 19.6–45.3)
MCH RBC QN AUTO: 30 PG (ref 26.6–33)
MCHC RBC AUTO-ENTMCNC: 31.6 G/DL (ref 31.5–35.7)
MCV RBC AUTO: 95 FL (ref 79–97)
MONOCYTES # BLD AUTO: 0.59 10*3/MM3 (ref 0.1–0.9)
MONOCYTES NFR BLD AUTO: 7.7 % (ref 5–12)
NEUTROPHILS NFR BLD AUTO: 5.58 10*3/MM3 (ref 1.7–7)
NEUTROPHILS NFR BLD AUTO: 72.9 % (ref 42.7–76)
NITRITE UR QL STRIP: NEGATIVE
NRBC BLD AUTO-RTO: 0 /100 WBC (ref 0–0.2)
PH UR STRIP.AUTO: 6.5 [PH] (ref 5–8)
PLATELET # BLD AUTO: 154 10*3/MM3 (ref 140–450)
PMV BLD AUTO: 10.6 FL (ref 6–12)
POTASSIUM SERPL-SCNC: 3.9 MMOL/L (ref 3.5–5.2)
PROT UR QL STRIP: ABNORMAL
QT INTERVAL: 396 MS
RBC # BLD AUTO: 4.2 10*6/MM3 (ref 3.77–5.28)
RBC # UR STRIP: ABNORMAL /HPF
REF LAB TEST METHOD: ABNORMAL
SODIUM SERPL-SCNC: 137 MMOL/L (ref 136–145)
SP GR UR STRIP: <1.005 (ref 1–1.03)
SQUAMOUS #/AREA URNS HPF: ABNORMAL /HPF
UROBILINOGEN UR QL STRIP: ABNORMAL
WBC # UR STRIP: ABNORMAL /HPF
WBC NRBC COR # BLD: 7.65 10*3/MM3 (ref 3.4–10.8)

## 2023-02-04 PROCEDURE — 93005 ELECTROCARDIOGRAM TRACING: CPT | Performed by: EMERGENCY MEDICINE

## 2023-02-04 PROCEDURE — 87086 URINE CULTURE/COLONY COUNT: CPT | Performed by: EMERGENCY MEDICINE

## 2023-02-04 PROCEDURE — 81001 URINALYSIS AUTO W/SCOPE: CPT | Performed by: EMERGENCY MEDICINE

## 2023-02-04 PROCEDURE — 25010000002 IOPAMIDOL 61 % SOLUTION: Performed by: EMERGENCY MEDICINE

## 2023-02-04 PROCEDURE — 74178 CT ABD&PLV WO CNTR FLWD CNTR: CPT

## 2023-02-04 PROCEDURE — 85025 COMPLETE CBC W/AUTO DIFF WBC: CPT | Performed by: EMERGENCY MEDICINE

## 2023-02-04 PROCEDURE — 99283 EMERGENCY DEPT VISIT LOW MDM: CPT

## 2023-02-04 PROCEDURE — 93010 ELECTROCARDIOGRAM REPORT: CPT | Performed by: INTERNAL MEDICINE

## 2023-02-04 PROCEDURE — 80048 BASIC METABOLIC PNL TOTAL CA: CPT | Performed by: EMERGENCY MEDICINE

## 2023-02-04 RX ORDER — CEPHALEXIN 500 MG/1
500 CAPSULE ORAL 4 TIMES DAILY
Qty: 28 CAPSULE | Refills: 0 | Status: SHIPPED | OUTPATIENT
Start: 2023-02-04 | End: 2023-02-11

## 2023-02-04 RX ORDER — SODIUM CHLORIDE 0.9 % (FLUSH) 0.9 %
10 SYRINGE (ML) INJECTION AS NEEDED
Status: DISCONTINUED | OUTPATIENT
Start: 2023-02-04 | End: 2023-02-04 | Stop reason: HOSPADM

## 2023-02-04 RX ORDER — NITROFURANTOIN 25; 75 MG/1; MG/1
CAPSULE ORAL
COMMUNITY
Start: 2023-01-23

## 2023-02-04 RX ORDER — PHENAZOPYRIDINE HYDROCHLORIDE 200 MG/1
200 TABLET, FILM COATED ORAL 3 TIMES DAILY PRN
Qty: 6 TABLET | Refills: 0 | Status: SHIPPED | OUTPATIENT
Start: 2023-02-04 | End: 2023-02-06

## 2023-02-04 RX ADMIN — IOPAMIDOL 85 ML: 612 INJECTION, SOLUTION INTRAVENOUS at 11:00

## 2023-02-04 RX ADMIN — SODIUM CHLORIDE, POTASSIUM CHLORIDE, SODIUM LACTATE AND CALCIUM CHLORIDE 1000 ML: 600; 310; 30; 20 INJECTION, SOLUTION INTRAVENOUS at 09:46

## 2023-02-04 NOTE — ED TRIAGE NOTES
From home with c/o blood in urine this morning, dizziness x 2 wks.  Dx with UTI 2 wks ago, finished abx    Pt wearing mask on arrival.

## 2023-02-04 NOTE — ED NOTES
Pt here c/o dysuria, urine odor and hematuria today following seven day course of Macrobid for suspected UTI.  Per pt, she visited PCP approx 9 days ago for dizziness x2 weeks.  PCP suspected UTI and prescribed Macrobid BID x7 days, which she finished two days PTA.  This morning began experiencing her current symptoms.  Currently no weakness, dizziness, N/V/D, abdominal pain, flank pain, fever or chills.

## 2023-02-04 NOTE — ED PROVIDER NOTES
EMERGENCY DEPARTMENT ENCOUNTER    Room Number:  39/39  Date seen:  2/4/2023  PCP: Alena Paz MD      HPI:  Chief Complaint: Gross hematuria  A complete HPI/ROS/PMH/PSH/SH/FH are unobtainable due to: None  Context: Debbie Carrasquillo is a 72 y.o. female who presents to the ED c/o gross hematuria.  She noticed that today.  She has had associated burning with urination.  She was treated couple weeks ago for UTI with Macrobid.  She also reports that she has been feeling lightheaded for the past 2 weeks.  It is worse with sitting down and standing up.  She has had no fever.  No vision change, no headache.          PAST MEDICAL HISTORY  Active Ambulatory Problems     Diagnosis Date Noted   • Chest pain 09/05/2018     Resolved Ambulatory Problems     Diagnosis Date Noted   • No Resolved Ambulatory Problems     Past Medical History:   Diagnosis Date   • Cardiac abnormality    • Diabetes mellitus (HCC)    • Hyperlipidemia    • Hypertension          PAST SURGICAL HISTORY  Past Surgical History:   Procedure Laterality Date   • CARDIAC CATHETERIZATION N/A 9/7/2018    Procedure: Left Heart Cath;  Surgeon: Victorina Scott MD;  Location: [facility] INVASIVE LOCATION;  Service: Cardiology   • CARDIAC CATHETERIZATION N/A 9/7/2018    Procedure: Coronary angiography;  Surgeon: Victorina Scott MD;  [facility];  Service: Cardiology   • CARDIAC CATHETERIZATION N/A 9/7/2018    Procedure: Left ventriculography;  Surgeon: Victorina Scott MD;  Location: [facility] CATH INVASIVE LOCATION;  Service: Cardiology         FAMILY HISTORY  History reviewed. No pertinent family history.      SOCIAL HISTORY  Social History     Socioeconomic History   • Marital status:    Tobacco Use   • Smoking status: Former   Substance and Sexual Activity   • Alcohol use: Yes   • Drug use: No         ALLERGIES  Novocain [procaine] and Other        REVIEW OF SYSTEMS  Review of Systems     All systems  reviewed and negative except for those discussed in HPI.       PHYSICAL EXAM  ED Triage Vitals   Temp Heart Rate Resp BP SpO2   02/04/23 0918 02/04/23 0918 02/04/23 0918 02/04/23 0932 02/04/23 0918   97.8 °F (36.6 °C) 89 16 161/75 91 %      Temp src Heart Rate Source Patient Position BP Location FiO2 (%)   02/04/23 0918 -- 02/04/23 0932 02/04/23 0932 --   Tympanic  Lying Right arm        Physical Exam      GENERAL: no acute distress  HENT: nares patent  EYES: no scleral icterus  CV: regular rhythm, normal rate  RESPIRATORY: normal effort, clear to auscultation bilaterally  ABDOMEN: soft, nontender  MUSCULOSKELETAL: no deformity  NEURO:   Recent and remote memory functions are normal. The patient is attentive with normal concentration. Language is fluent. Speech is clear. The speech is non-dysarthric. Fund of knowledge is normal.   Symmetric smile with no facial droop.  Eyes close shut strongly bilaterally.  Symmetric eyebrow raise bilaterally.  EOMI, PERRL  CN II-XII grossly normal otherwise.  5/5 strength to extremities.  No pronator drift.  Intact FNF.  No meningismus.  PSYCH:  calm, cooperative  SKIN: warm, dry    Vital signs and nursing notes reviewed.          LAB RESULTS  Recent Results (from the past 24 hour(s))   Basic Metabolic Panel    Collection Time: 02/04/23  9:44 AM    Specimen: Blood   Result Value Ref Range    Glucose 114 (H) 65 - 99 mg/dL    BUN 13 8 - 23 mg/dL    Creatinine 0.93 0.57 - 1.00 mg/dL    Sodium 137 136 - 145 mmol/L    Potassium 3.9 3.5 - 5.2 mmol/L    Chloride 103 98 - 107 mmol/L    CO2 27.0 22.0 - 29.0 mmol/L    Calcium 9.2 8.6 - 10.5 mg/dL    BUN/Creatinine Ratio 14.0 7.0 - 25.0    Anion Gap 7.0 5.0 - 15.0 mmol/L    eGFR 65.8 >60.0 mL/min/1.73   CBC Auto Differential    Collection Time: 02/04/23  9:44 AM    Specimen: Blood   Result Value Ref Range    WBC 7.65 3.40 - 10.80 10*3/mm3    RBC 4.20 3.77 - 5.28 10*6/mm3    Hemoglobin 12.6 12.0 - 15.9 g/dL    Hematocrit 39.9 34.0 - 46.6 %     MCV 95.0 79.0 - 97.0 fL    MCH 30.0 26.6 - 33.0 pg    MCHC 31.6 31.5 - 35.7 g/dL    RDW 13.7 12.3 - 15.4 %    RDW-SD 47.4 37.0 - 54.0 fl    MPV 10.6 6.0 - 12.0 fL    Platelets 154 140 - 450 10*3/mm3    Neutrophil % 72.9 42.7 - 76.0 %    Lymphocyte % 16.9 (L) 19.6 - 45.3 %    Monocyte % 7.7 5.0 - 12.0 %    Eosinophil % 1.7 0.3 - 6.2 %    Basophil % 0.5 0.0 - 1.5 %    Immature Grans % 0.3 0.0 - 0.5 %    Neutrophils, Absolute 5.58 1.70 - 7.00 10*3/mm3    Lymphocytes, Absolute 1.29 0.70 - 3.10 10*3/mm3    Monocytes, Absolute 0.59 0.10 - 0.90 10*3/mm3    Eosinophils, Absolute 0.13 0.00 - 0.40 10*3/mm3    Basophils, Absolute 0.04 0.00 - 0.20 10*3/mm3    Immature Grans, Absolute 0.02 0.00 - 0.05 10*3/mm3    nRBC 0.0 0.0 - 0.2 /100 WBC   Urinalysis With Culture If Indicated - Urine, Clean Catch    Collection Time: 02/04/23  9:45 AM    Specimen: Urine, Clean Catch   Result Value Ref Range    Color, UA Yellow Yellow, Straw    Appearance, UA Cloudy (A) Clear    pH, UA 6.5 5.0 - 8.0    Specific Gravity, UA <1.005 (L) 1.005 - 1.030    Glucose, UA Negative Negative    Ketones, UA Negative Negative    Bilirubin, UA Negative Negative    Blood, UA Large (3+) (A) Negative    Protein, UA Trace (A) Negative    Leuk Esterase, UA Moderate (2+) (A) Negative    Nitrite, UA Negative Negative    Urobilinogen, UA 0.2 E.U./dL 0.2 - 1.0 E.U./dL   Urinalysis, Microscopic Only - Urine, Clean Catch    Collection Time: 02/04/23  9:45 AM    Specimen: Urine, Clean Catch   Result Value Ref Range    RBC, UA 3-5 (A) None Seen, 0-2 /HPF    WBC, UA Too Numerous to Count (A) None Seen, 0-2 /HPF    Bacteria, UA None Seen None Seen /HPF    Squamous Epithelial Cells, UA 0-2 None Seen, 0-2 /HPF    Hyaline Casts, UA 0-2 None Seen /LPF    Methodology Automated Microscopy    ECG 12 Lead Other; dizziness    Collection Time: 02/04/23  9:45 AM   Result Value Ref Range    QT Interval 396 ms       Ordered the above labs and reviewed the results.        RADIOLOGY  CT  Abdomen Pelvis With & Without Contrast    Result Date: 2/4/2023  CT ABDOMEN AND PELVIS WITHOUT AND WITH IV CONTRAST  HISTORY: Gross hematuria.  TECHNIQUE:  CT includes axial imaging from the lung bases to the trochanters without intravenous contrast and without use of oral contrast. This was followed by intravenous contrast administration CT imaging through the abdomen and pelvis with IV contrast. The abdomen and pelvis were scanned in a delayed phase of contrast. Data reconstructed in coronal and sagittal planes. Radiation dose reduction techniques were utilized, including automated exposure control and exposure modulation based on body size.  COMPARISON: CT abdomen and pelvis 10/31/2021  FINDINGS: There is peripheral predominant basilar interstitial disease without change. Small splenic and hepatic calcifications are without change and associated with old granulomatous disease. Liver, spleen, adrenal glands, pancreas, kidneys appear within normal limits. There is no hydronephrosis. There is no renal or ureteral stone. Within the anterior right kidney there is a low-density lesion measuring 7 mm that is too small to characterize. No additional renal abnormalities demonstrated. There is no hydronephrosis.  There is colonic diverticulosis without evidence for diverticulitis. There is no ascites. Atherosclerotic calcifications are present involving the abdominal aorta and iliac vasculature.      1. No renal stone or hydronephrosis. 7 mm right renal low-density lesion is most likely a small cyst though too small to characterize. 2. Atherosclerotic disease. 3. Sigmoid diverticulosis without evidence for diverticulitis.  Radiation dose reduction techniques were utilized, including automated exposure control and exposure modulation based on body size.         Ordered the above noted radiological studies. Reviewed by me in PACS.          PROCEDURES  Procedures          MEDICATIONS GIVEN IN ER  Medications   sodium chloride  0.9 % flush 10 mL (has no administration in time range)   lactated ringers bolus 1,000 mL (1,000 mL Intravenous New Bag 2/4/23 0962)   iopamidol (ISOVUE-300) 61 % injection 100 mL (85 mL Intravenous Given by Other 2/4/23 1100)         MEDICAL DECISION MAKING, PROGRESS, and CONSULTS    All labs have been independently reviewed by me.  All radiology studies have been reviewed by me and discussed with radiologist dictating the report.   EKG's independently viewed and interpreted by me.  Discussion below represents my analysis of pertinent findings related to patient's condition, differential diagnosis, treatment plan and final disposition.      Orders placed during this visit:  Orders Placed This Encounter   Procedures   • Urine Culture - Urine,   • CT Abdomen Pelvis With & Without Contrast   • Basic Metabolic Panel   • Urinalysis With Culture If Indicated - Urine, Clean Catch   • CBC Auto Differential   • Urinalysis, Microscopic Only - Urine, Clean Catch   • ECG 12 Lead Other; dizziness   • Insert Peripheral IV   • CBC & Differential         Additional sources:  - Discussed/obtained information from independent historians: Patient's family at bedside  Additional information was obtained to confirm the patient's history.    - External (non-ED) record review: See summary in ED course below were reviewed urine culture from [facility]        Differential diagnosis:    UTI, ureteral stone, renal malignancy, intracranial mass, intracranial hemorrhage, orthostatic dizziness, cardiac arrhythmia      Additional orders considered but not ordered:  I considered a CT scan of the head.  However she has a normal neurological examination at this time.  She has no headache.        Independent interpretation of labs, radiology studies, and discussions with consultants:  ED Course as of 02/04/23 1154   Sat Feb 04, 2023   0938 On medical chart review, reviewed patient's labs from [facility].  She had a urine culture that was  collected on 1/23/2023.  It showed no growth. [TD]   0949 EKG independently interpreted by myself.  Time 9:45 AM.  My findings are: Sinus rhythm.  Heart rate 73.  LVH.  No acute ST abnormality. [TD]   1054 RBC, UA(!): 3-5 [TD]   1054 WBC, UA(!): Too Numerous to Count [TD]   1054 Leukocytes, UA(!): Moderate (2+) [TD]   1054 Nitrite, UA: Negative [TD]   1054 WBC: 7.65 [TD]   1054 Hemoglobin: 12.6 [TD]   1054 Creatinine: 0.93 [TD]   1054 Sodium: 137 [TD]   1054 Potassium: 3.9 [TD]   1141 Patient has 7 mm right renal low-density lesion that is most likely a small cyst though too small to characterize.  Atherosclerotic disease.  Sigmoid diverticulosis without evidence of diverticulitis. [TD]   1144 I updated the patient on the results.  Urine culture is pending.  I am giving her antibiotics.  I recommended follow-up with urology.  We also discussed return precautions. [TD]      ED Course User Index  [TD] Ze Mcmillan II, MD                  PPE: The patient wore a mask throughout the entire encounter. I wore a well-fitting mask.    DIAGNOSIS  Final diagnoses:   Cystitis   Renal cyst         DISPOSITION  DISCHARGE    FOLLOW-UP  [facility] Emergency Department  [address]  [facility] [id number] [phone]  Go to   If symptoms worsen    [facility]  [address] [facility] [address]  [phone]  Schedule an appointment as soon as possible for a visit       Alena Paz MD  [address] [facility]  [facility] [address]  [phone]    Go to   As needed         Medication List      New Prescriptions    cephalexin 500 MG capsule  Commonly known as: KEFLEX  Take 1 capsule by mouth 4 (Four) Times a Day for 7 days.     phenazopyridine 200 MG tablet  Commonly known as: PYRIDIUM  Take 1 tablet by mouth 3 (Three) Times a Day As Needed for Bladder Spasms for up to 2 days.           Where to Get Your Medications      These medications were sent to [facility]  [id number] - [place], [place] - [address] [facility] - [phone]  - [phone] FX  [address] [facility], [facility] [address]    Phone: [phone]   · cephalexin 500 MG capsule  · phenazopyridine 200 MG tablet             Latest Documented Vital Signs:  As of 11:54 EST  BP- 161/75 HR- 89 Temp- 97.8 °F (36.6 °C) (Tympanic) O2 sat- 91%      --    Please note that portions of this were completed with a voice recognition program.       Note Disclaimer: At [facility], we believe that sharing information builds trust and better relationships. You are receiving this note because you are receiving care at [facility] or recently visited. It is possible you will see health information before a provider has talked with you about it. This kind of information can be easy to misunderstand. To help you fully understand what it means for your health, we urge you to discuss this note with your provider.       Ze Mcmillan II, MD  02/04/23 9421

## 2023-02-05 LAB — BACTERIA SPEC AEROBE CULT: NO GROWTH

## 2024-04-18 ENCOUNTER — OFFICE VISIT (OUTPATIENT)
Dept: CARDIOLOGY | Facility: CLINIC | Age: 74
End: 2024-04-18
Payer: MEDICARE

## 2024-04-18 VITALS
DIASTOLIC BLOOD PRESSURE: 66 MMHG | BODY MASS INDEX: 33.68 KG/M2 | HEIGHT: 62 IN | SYSTOLIC BLOOD PRESSURE: 118 MMHG | WEIGHT: 183 LBS | HEART RATE: 74 BPM

## 2024-04-18 DIAGNOSIS — R06.02 SHORTNESS OF BREATH: ICD-10-CM

## 2024-04-18 DIAGNOSIS — R07.9 CHEST PAIN, UNSPECIFIED TYPE: Primary | ICD-10-CM

## 2024-04-18 DIAGNOSIS — R94.31 ABNORMAL ELECTROCARDIOGRAM: ICD-10-CM

## 2024-04-18 PROCEDURE — 99204 OFFICE O/P NEW MOD 45 MIN: CPT | Performed by: INTERNAL MEDICINE

## 2024-04-18 PROCEDURE — 93000 ELECTROCARDIOGRAM COMPLETE: CPT | Performed by: INTERNAL MEDICINE

## 2024-04-18 RX ORDER — FUROSEMIDE 40 MG/1
40 TABLET ORAL DAILY
COMMUNITY
Start: 2024-03-21

## 2024-04-18 NOTE — PROGRESS NOTES
"NP REFER BY6 DR LUGO SOB   Subjective:        [facility]  Cardiology Consult Note    Patient Identification:  Name: Debbie Carrasquillo  Age: 73 y.o.  Sex: female  :  1950  MRN: [id number]             CC  CP  STENT 16 YR AGO  LT ARM PAIN  SOB  H/O DM  F/H    History of Present Illness:   73-year-old female here for the cardiac evaluation complaining of the chest pain left arm pain shortness of breath had stent 16 years ago    Patient has significant cardiac risk factors with diabetes and family history    Comorbid cardiac risk factors:     Past Medical History:  Past Medical History:   Diagnosis Date    Cardiac abnormality     stent placment    Diabetes mellitus     Hyperlipidemia     Hypertension      Past Surgical History:  Past Surgical History:   Procedure Laterality Date    CARDIAC CATHETERIZATION N/A 2018    Procedure: Left Heart Cath;  Surgeon: Victorina Scott MD;  Location:  [facility] CATH INVASIVE LOCATION;  Service: Cardiology    CARDIAC CATHETERIZATION N/A 2018    Procedure: Coronary angiography;  Surgeon: Victorina Scott MD;  Location:  [facility] CATH INVASIVE LOCATION;  Service: Cardiology    CARDIAC CATHETERIZATION N/A 2018    Procedure: Left ventriculography;  Surgeon: Victorina Scott MD;  Location:  [facility] CATH INVASIVE LOCATION;  Service: Cardiology      Allergies:  Allergies   Allergen Reactions    Cyclobenzaprine Nausea And Vomiting     MUSCLE RELAXERS    Glimepiride Diarrhea     diarrhea    Metformin Diarrhea    Novocain [Procaine] Other (See Comments)     Shaking      Other Provider Review Needed     \"muslce relaxers but I don't know which one.\"    Sulfamethoxazole-Trimethoprim Itching     Mild rash and facial flushing and redness     Home Meds:  (Not in a hospital admission)    Current Meds:     Current Outpatient Medications:     aspirin  MG tablet, Take 1 tablet by mouth Daily., Disp: , Rfl:     atorvastatin (LIPITOR) 40 MG tablet, Take 1 " tablet by mouth Daily., Disp: , Rfl:     furosemide (LASIX) 40 MG tablet, Take 1 tablet by mouth Daily., Disp: , Rfl:     isosorbide mononitrate (IMDUR) 30 MG 24 hr tablet, Take 0.5 tablets by mouth Daily., Disp: 15 tablet, Rfl: 6    nitroglycerin (NITROSTAT) 0.4 MG SL tablet, Place 1 tablet under the tongue Every 5 (Five) Minutes As Needed for Chest Pain. Take no more than 3 doses in 15 minutes., Disp: 25 tablet, Rfl: 12    Turmeric (QC TUMERIC COMPLEX PO), Take  by mouth., Disp: , Rfl:     nitrofurantoin, macrocrystal-monohydrate, (MACROBID) 100 MG capsule, TAKE 1 CAPSULE BY MOUTH TWO TIMES A DAY FOR 7 DAYS, Disp: , Rfl:   Social History:   Social History     Tobacco Use    Smoking status: Former    Smokeless tobacco: Not on file   Substance Use Topics    Alcohol use: Yes     Alcohol/week: 1.0 standard drink of alcohol     Types: 1 Glasses of wine per week     Comment: OCC      Family History:  Family History   Problem Relation Age of Onset    Heart disease Mother     Heart disease Sister     Heart disease Brother         Review of Systems    Constitutional: No weakness,fatigue, fever, rigors, chills   Eyes: No vision changes, eye pain   ENT/oropharynx: No difficulty swallowing, sore throat, epistaxis, changes in hearing   Cardiovascular: Chest pain and left arm pain   Respiratory: No shortness of breath, dyspnea on exertion, cough, wheezing hemoptysis   Gastrointestinal: No abdominal pain, nausea, vomiting, diarrhea, bloody stools   Genitourinary: No hematuria, dysuria   Neurological: No headache, tremors, numbness,  one-sided weakness    Musculoskeletal: No cramps, myalgias,  joint pain, joint swelling   Integument: No rash, edema           Constitutional:       Physical Exam   General:  Appears in no acute distress  Eyes: PERTL,  HEENT:  No JVD. Thyroid not visibly enlarged. No mucosal pallor or cyanosis  Respiratory: Respirations regular and unlabored at rest. BBS with good air entry in all fields. No  crackles, rubs or wheezes auscultated  Cardiovascular: S1S2 Regular rate and rhythm. No murmur, rub or gallop auscultated. No carotid bruits. DP/PT pulses    . No pretibial pitting edema  Gastrointestinal: Abdomen soft, flat, non tender. Bowel sounds present. No hepatosplenomegaly. No ascites  Musculoskeletal: VINCENT x4. No abnormal movements  Extremities: No digital clubbing or cyanosis  Skin: Color pink. Skin warm and dry to touch. No rashes  No xanthoma  Neuro: AAO x3 CN II-XII grossly intact            ECG 12 Lead    Date/Time: 4/18/2024 12:31 PM  Performed by: Victorina Scott MD    Authorized by: Victorina Scott MD  Comparison: compared with previous ECG   Similar to previous ECG  Rhythm: sinus rhythm    Clinical impression: non-specific ECG              Cardiographics  ECG:     Telemetry:    Echocardiogram:   No results found for this or any previous visit.        Imaging  Chest X-ray:     Lab Review                               Assessment:/ Recommendations / Plan:                  ICD-10-CM ICD-9-CM   1. Chest pain, unspecified type  R07.9 786.50   2. Abnormal electrocardiogram  R94.31 794.31   3. Shortness of breath  R06.02 786.05     1. Chest pain, unspecified type  Considering the patient's symptoms as well as clinical situation and  EKG findings, along with cardiac risk factors, ischemic workup is necessary to rule out ischemic cardiomyopathy, stress induced arrhythmias, and functional capacity for diagnosis as well as prognostic consideration    - Stress Test With Myocardial Perfusion One Day  - Adult Transthoracic Echo Complete W/ Cont if Necessary Per Protocol    2. Abnormal electrocardiogram  Considering patient's medical condition as well as the risk factors, patient will require echocardiogram for further evaluation for the LV function, four-chamber evaluation, including the pressures, valvular function and  pericardial disease and pericardial effusion    - Stress Test With Myocardial  Perfusion One Day  - Adult Transthoracic Echo Complete W/ Cont if Necessary Per Protocol    3. Shortness of breath  Considering the patient's symptoms as well as clinical situation and  EKG findings, along with cardiac risk factors, ischemic workup is necessary to rule out ischemic cardiomyopathy, stress induced arrhythmias, and functional capacity for diagnosis as well as prognostic consideration    - Stress Test With Myocardial Perfusion One Day  - Adult Transthoracic Echo Complete W/ Cont if Necessary Per Protocol    STRESS CARDIOLYTE, ECHO  FOLLOW UP  MOST LIKELY WILL NEED CATH  RECUCE LASIX 20 MG  Pros and cons of this new medication / change medication has been explained to  the patient    Possible side effects has been explained    Associated need of the blood  Work has been explained    Need for the compliance of the medication has been explained    Labs/tests ordered for am      Victorina Scott MD  5/1/2024, 15:08 EDT      EMR Dragon/Transcription:   Dictated utilizing Dragon dictation

## 2024-04-19 ENCOUNTER — PATIENT ROUNDING (BHMG ONLY) (OUTPATIENT)
Dept: CARDIOLOGY | Facility: CLINIC | Age: 74
End: 2024-04-19
Payer: MEDICARE

## 2024-04-19 NOTE — PROGRESS NOTES
April 19, 2024    Tell me about your visit with us. What things went well?         We're always looking for ways to make our patients' experiences even better. Do you have recommendations on ways we may improve?      Overall were you satisfied with your first visit to our practice?        I appreciate you taking the time to speak with me today. Is there anything else I can do for you?       Thank you, and have a great day.

## 2024-05-01 PROBLEM — R94.31 ABNORMAL ELECTROCARDIOGRAM: Status: ACTIVE | Noted: 2024-05-01

## 2024-05-01 PROBLEM — R06.02 SHORTNESS OF BREATH: Status: ACTIVE | Noted: 2024-05-01

## 2024-05-14 ENCOUNTER — HOSPITAL ENCOUNTER (OUTPATIENT)
Dept: CARDIOLOGY | Facility: HOSPITAL | Age: 74
Discharge: HOME | End: 2024-05-14
Admitting: INTERNAL MEDICINE
Payer: MEDICARE

## 2024-05-14 ENCOUNTER — HOSPITAL ENCOUNTER (OUTPATIENT)
Dept: CARDIOLOGY | Facility: HOSPITAL | Age: 74
Discharge: HOME | End: 2024-05-14
Payer: MEDICARE

## 2024-05-14 VITALS
DIASTOLIC BLOOD PRESSURE: 76 MMHG | BODY MASS INDEX: 33.68 KG/M2 | HEIGHT: 62 IN | OXYGEN SATURATION: 96 % | WEIGHT: 183 LBS | HEART RATE: 72 BPM | SYSTOLIC BLOOD PRESSURE: 144 MMHG

## 2024-05-14 VITALS
HEART RATE: 70 BPM | HEIGHT: 62 IN | WEIGHT: 183 LBS | DIASTOLIC BLOOD PRESSURE: 74 MMHG | BODY MASS INDEX: 33.68 KG/M2 | SYSTOLIC BLOOD PRESSURE: 152 MMHG

## 2024-05-14 LAB
AORTIC ARCH: 2.1 CM
AORTIC DIMENSIONLESS INDEX: 0.5 (DI)
ASCENDING AORTA: 2.2 CM
BH CV ECHO MEAS - ACS: 1.79 CM
BH CV ECHO MEAS - AO MAX PG: 11 MMHG
BH CV ECHO MEAS - AO MEAN PG: 6 MMHG
BH CV ECHO MEAS - AO ROOT DIAM: 2.7 CM
BH CV ECHO MEAS - AO V2 MAX: 165.6 CM/SEC
BH CV ECHO MEAS - AO V2 VTI: 41 CM
BH CV ECHO MEAS - AVA(I,D): 1.36 CM2
BH CV ECHO MEAS - EDV(CUBED): 50.1 ML
BH CV ECHO MEAS - EDV(MOD-SP2): 56 ML
BH CV ECHO MEAS - EDV(MOD-SP4): 85 ML
BH CV ECHO MEAS - EF(MOD-BP): 65 %
BH CV ECHO MEAS - EF(MOD-SP2): 66.1 %
BH CV ECHO MEAS - EF(MOD-SP4): 63.5 %
BH CV ECHO MEAS - ESV(CUBED): 14.7 ML
BH CV ECHO MEAS - ESV(MOD-SP2): 19 ML
BH CV ECHO MEAS - ESV(MOD-SP4): 31 ML
BH CV ECHO MEAS - FS: 33.6 %
BH CV ECHO MEAS - IVS/LVPW: 1.12 CM
BH CV ECHO MEAS - IVSD: 0.98 CM
BH CV ECHO MEAS - LA DIMENSION: 4.7 CM
BH CV ECHO MEAS - LAT PEAK E' VEL: 7.2 CM/SEC
BH CV ECHO MEAS - LV DIASTOLIC VOL/BSA (35-75): 46.2 CM2
BH CV ECHO MEAS - LV MASS(C)D: 101.1 GRAMS
BH CV ECHO MEAS - LV MAX PG: 3.4 MMHG
BH CV ECHO MEAS - LV MEAN PG: 1.58 MMHG
BH CV ECHO MEAS - LV SYSTOLIC VOL/BSA (12-30): 16.8 CM2
BH CV ECHO MEAS - LV V1 MAX: 91.7 CM/SEC
BH CV ECHO MEAS - LV V1 VTI: 18.9 CM
BH CV ECHO MEAS - LVIDD: 3.7 CM
BH CV ECHO MEAS - LVIDS: 2.45 CM
BH CV ECHO MEAS - LVOT AREA: 3 CM2
BH CV ECHO MEAS - LVOT DIAM: 1.94 CM
BH CV ECHO MEAS - LVPWD: 0.88 CM
BH CV ECHO MEAS - MED PEAK E' VEL: 6.2 CM/SEC
BH CV ECHO MEAS - MR MAX PG: 132.5 MMHG
BH CV ECHO MEAS - MR MAX VEL: 575.5 CM/SEC
BH CV ECHO MEAS - MV A DUR: 0.13 SEC
BH CV ECHO MEAS - MV A MAX VEL: 115.4 CM/SEC
BH CV ECHO MEAS - MV DEC SLOPE: 487.8 CM/SEC2
BH CV ECHO MEAS - MV DEC TIME: 0.17 SEC
BH CV ECHO MEAS - MV E MAX VEL: 90.2 CM/SEC
BH CV ECHO MEAS - MV E/A: 0.78
BH CV ECHO MEAS - MV MAX PG: 6.5 MMHG
BH CV ECHO MEAS - MV MEAN PG: 3.4 MMHG
BH CV ECHO MEAS - MV P1/2T: 66.4 MSEC
BH CV ECHO MEAS - MV V2 VTI: 30.9 CM
BH CV ECHO MEAS - MVA(P1/2T): 3.3 CM2
BH CV ECHO MEAS - MVA(VTI): 1.81 CM2
BH CV ECHO MEAS - PA ACC TIME: 0.13 SEC
BH CV ECHO MEAS - PA V2 MAX: 93.7 CM/SEC
BH CV ECHO MEAS - PULM A REVS DUR: 0.15 SEC
BH CV ECHO MEAS - PULM A REVS VEL: 29.1 CM/SEC
BH CV ECHO MEAS - PULM DIAS VEL: 39.3 CM/SEC
BH CV ECHO MEAS - PULM S/D: 1.52
BH CV ECHO MEAS - PULM SYS VEL: 59.7 CM/SEC
BH CV ECHO MEAS - QP/QS: 0.95
BH CV ECHO MEAS - RAP SYSTOLE: 3 MMHG
BH CV ECHO MEAS - RV MAX PG: 1.52 MMHG
BH CV ECHO MEAS - RV V1 MAX: 61.6 CM/SEC
BH CV ECHO MEAS - RV V1 VTI: 14.3 CM
BH CV ECHO MEAS - RVDD: 3 CM
BH CV ECHO MEAS - RVOT DIAM: 2.17 CM
BH CV ECHO MEAS - RVSP: 34.3 MMHG
BH CV ECHO MEAS - SUP REN AO DIAM: 2.3 CM
BH CV ECHO MEAS - SV(LVOT): 55.9 ML
BH CV ECHO MEAS - SV(MOD-SP2): 37 ML
BH CV ECHO MEAS - SV(MOD-SP4): 54 ML
BH CV ECHO MEAS - SV(RVOT): 53.2 ML
BH CV ECHO MEAS - SVI(LVOT): 30.4 ML/M2
BH CV ECHO MEAS - SVI(MOD-SP2): 20.1 ML/M2
BH CV ECHO MEAS - SVI(MOD-SP4): 29.3 ML/M2
BH CV ECHO MEAS - TAPSE (>1.6): 2.9 CM
BH CV ECHO MEAS - TR MAX PG: 31.3 MMHG
BH CV ECHO MEAS - TR MAX VEL: 279.6 CM/SEC
BH CV ECHO MEASUREMENTS AVERAGE E/E' RATIO: 13.46
BH CV XLRA - RV BASE: 2.6 CM
BH CV XLRA - RV LENGTH: 5.5 CM
BH CV XLRA - RV MID: 2.31 CM
BH CV XLRA - TDI S': 12.8 CM/SEC
LEFT ATRIUM VOLUME INDEX: 26.6 ML/M2

## 2024-05-14 PROCEDURE — A9500 TC99M SESTAMIBI: HCPCS | Performed by: INTERNAL MEDICINE

## 2024-05-14 PROCEDURE — 0 TECHNETIUM SESTAMIBI: Performed by: INTERNAL MEDICINE

## 2024-05-14 PROCEDURE — 93306 TTE W/DOPPLER COMPLETE: CPT

## 2024-05-14 PROCEDURE — 93017 CV STRESS TEST TRACING ONLY: CPT

## 2024-05-14 PROCEDURE — 25510000001 PERFLUTREN (DEFINITY) 8.476 MG IN SODIUM CHLORIDE (PF) 0.9 % 10 ML INJECTION: Performed by: INTERNAL MEDICINE

## 2024-05-14 PROCEDURE — 78452 HT MUSCLE IMAGE SPECT MULT: CPT

## 2024-05-14 PROCEDURE — 93306 TTE W/DOPPLER COMPLETE: CPT | Performed by: INTERNAL MEDICINE

## 2024-05-14 RX ORDER — PIOGLITAZONEHYDROCHLORIDE 30 MG/1
30 TABLET ORAL DAILY
COMMUNITY
Start: 2024-04-24

## 2024-05-14 RX ADMIN — TECHNETIUM TC 99M SESTAMIBI 1 DOSE: 1 INJECTION INTRAVENOUS at 07:08

## 2024-05-14 RX ADMIN — SODIUM CHLORIDE 2 ML: 9 INJECTION INTRAMUSCULAR; INTRAVENOUS; SUBCUTANEOUS at 07:50

## 2024-05-14 RX ADMIN — TECHNETIUM TC 99M SESTAMIBI 1 DOSE: 1 INJECTION INTRAVENOUS at 08:49

## 2024-05-16 LAB
BH CV IMMEDIATE POST RECOVERY TECH DATA SYMPTOMS: NORMAL
BH CV IMMEDIATE POST TECH DATA BLOOD PRESSURE: NORMAL MMHG
BH CV IMMEDIATE POST TECH DATA HEART RATE: 114 BPM
BH CV IMMEDIATE POST TECH DATA OXYGEN SATS: 98 %
BH CV REST NUCLEAR ISOTOPE DOSE: 10.9 MCI
BH CV STRESS BP STAGE 1: NORMAL
BH CV STRESS BP STAGE 2: NORMAL
BH CV STRESS DURATION MIN STAGE 1: 2
BH CV STRESS DURATION MIN STAGE 2: 2
BH CV STRESS DURATION SEC STAGE 1: 29
BH CV STRESS DURATION SEC STAGE 2: 4
BH CV STRESS GRADE STAGE 1: 5
BH CV STRESS GRADE STAGE 2: 10
BH CV STRESS HR STAGE 1: 110
BH CV STRESS HR STAGE 2: 132
BH CV STRESS METS STAGE 1: 2.1
BH CV STRESS METS STAGE 2: 4.1
BH CV STRESS NUCLEAR ISOTOPE DOSE: 32.9 MCI
BH CV STRESS O2 STAGE 1: 97
BH CV STRESS PROTOCOL 1: NORMAL
BH CV STRESS RECOVERY BP: NORMAL MMHG
BH CV STRESS RECOVERY HR: 82 BPM
BH CV STRESS RECOVERY O2: 98 %
BH CV STRESS SPEED STAGE 1: 1.4
BH CV STRESS SPEED STAGE 2: 1.8
BH CV STRESS STAGE 1: 0.5
BH CV STRESS STAGE 2: 1
BH CV THREE MINUTE POST TECH DATA BLOOD PRESSURE: NORMAL MMHG
BH CV THREE MINUTE POST TECH DATA HEART RATE: 88 BPM
BH CV THREE MINUTE POST TECH DATA OXYGEN SATURATION: 98 %
LV EF NUC BP: 70 %
MAXIMAL PREDICTED HEART RATE: 148 BPM
PERCENT MAX PREDICTED HR: 89.19 %
STRESS BASELINE BP: NORMAL MMHG
STRESS BASELINE HR: 72 BPM
STRESS O2 SAT REST: 96 %
STRESS PERCENT HR: 105 %
STRESS POST ESTIMATED WORKLOAD: 4.1 METS
STRESS POST EXERCISE DUR MIN: 4 MIN
STRESS POST EXERCISE DUR SEC: 33 SEC
STRESS POST O2 SAT PEAK: 97 %
STRESS POST PEAK BP: NORMAL MMHG
STRESS POST PEAK HR: 132 BPM
STRESS TARGET HR: 126 BPM

## 2024-05-23 ENCOUNTER — OFFICE VISIT (OUTPATIENT)
Dept: CARDIOLOGY | Facility: CLINIC | Age: 74
End: 2024-05-23
Payer: MEDICARE

## 2024-05-23 VITALS
SYSTOLIC BLOOD PRESSURE: 138 MMHG | HEART RATE: 70 BPM | WEIGHT: 183 LBS | DIASTOLIC BLOOD PRESSURE: 69 MMHG | BODY MASS INDEX: 33.68 KG/M2 | HEIGHT: 62 IN

## 2024-05-23 DIAGNOSIS — R06.02 SHORTNESS OF BREATH: ICD-10-CM

## 2024-05-23 DIAGNOSIS — R94.31 ABNORMAL ELECTROCARDIOGRAM: ICD-10-CM

## 2024-05-23 DIAGNOSIS — R07.9 CHEST PAIN, UNSPECIFIED TYPE: Primary | ICD-10-CM

## 2024-05-23 PROCEDURE — 99213 OFFICE O/P EST LOW 20 MIN: CPT | Performed by: INTERNAL MEDICINE

## 2024-05-23 RX ORDER — NITROGLYCERIN 0.4 MG/1
0.4 TABLET SUBLINGUAL
Qty: 25 TABLET | Refills: 12 | Status: SHIPPED | OUTPATIENT
Start: 2024-05-23

## 2024-05-23 RX ORDER — DULAGLUTIDE 0.75 MG/.5ML
INJECTION, SOLUTION SUBCUTANEOUS
COMMUNITY
Start: 2024-05-15

## 2024-05-23 NOTE — PROGRESS NOTES
"TEST RESULTS   Subjective:        Debbie Carrasquillo is a 73 y.o. female who here for follow up    CC  Chest pain shortness of breath  HPI  73-year-old female with a chest pain abnormal EKG and shortness of breath here for the test results denies any chest pains tightness heaviness or the pressure sensation     Problems Addressed this Visit          Cardiac and Vasculature    Chest pain - Primary    Abnormal electrocardiogram       Pulmonary and Pneumonias    Shortness of breath     Diagnoses         Codes Comments    Chest pain, unspecified type    -  Primary ICD-10-CM: R07.9  ICD-9-CM: 786.50     Abnormal electrocardiogram     ICD-10-CM: R94.31  ICD-9-CM: 794.31     Shortness of breath     ICD-10-CM: R06.02  ICD-9-CM: 786.05           .    The following portions of the patient's history were reviewed and updated as appropriate: allergies, current medications, past family history, past medical history, past social history, past surgical history and problem list.    Past Medical History:   Diagnosis Date    Cardiac abnormality     stent placment    Diabetes mellitus     Hyperlipidemia     Hypertension      reports that she has quit smoking. She does not have any smokeless tobacco history on file. She reports current alcohol use of about 1.0 standard drink of alcohol per week. She reports that she does not use drugs.   Family History   Problem Relation Age of Onset    Heart disease Mother     Heart disease Sister     Heart disease Brother        Review of Systems  Constitutional: No wt loss, fever, fatigue  Gastrointestinal: No nausea, abdominal pain  Behavioral/Psych: No insomnia or anxiety   Cardiovascular no chest pains or tightness in the chest  Objective:       Physical Exam  /69   Pulse 70   Ht 157.5 cm (62\")   Wt 83 kg (183 lb)   BMI 33.47 kg/m²   General appearance: No acute changes   Neck: Trachea midline; NECK, supple, no thyromegaly or lymphadenopathy   Lungs: Normal size and shape, normal breath " sounds, equal distribution of air, no rales and rhonchi   CV: S1-S2 regular, no murmurs, no rub, no gallop   Abdomen: Soft, nontender; no masses , no abnormal abdominal sounds   Extremities: No deformity , normal color , no peripheral edema   Skin: Normal temperature, turgor and texture; no rash, ulcers          Procedures      Echocardiogram:    Results for orders placed in visit on 04/18/24    Adult Transthoracic Echo Complete W/ Cont if Necessary Per Protocol    Interpretation Summary    Left ventricular ejection fraction appears to be 61 - 65%.    Left ventricular diastolic function was normal.    Estimated right ventricular systolic pressure from tricuspid regurgitation is normal (<35 mmHg).    Interpretation Summary         Findings consistent with an abnormal ECG stress test.    Myocardial perfusion imaging indicates a normal myocardial perfusion study with no evidence of ischemia. Impressions are consistent with a low risk study.    Left ventricular ejection fraction is normal (Calculated EF = 70%        Current Outpatient Medications:     aspirin  MG tablet, Take 1 tablet by mouth Daily., Disp: , Rfl:     atorvastatin (LIPITOR) 40 MG tablet, Take 1 tablet by mouth Daily., Disp: , Rfl:     furosemide (LASIX) 40 MG tablet, Take 1 tablet by mouth Daily., Disp: , Rfl:     isosorbide mononitrate (IMDUR) 30 MG 24 hr tablet, Take 0.5 tablets by mouth Daily., Disp: 15 tablet, Rfl: 6    nitrofurantoin, macrocrystal-monohydrate, (MACROBID) 100 MG capsule, TAKE 1 CAPSULE BY MOUTH TWO TIMES A DAY FOR 7 DAYS, Disp: , Rfl:     nitroglycerin (NITROSTAT) 0.4 MG SL tablet, Place 1 tablet under the tongue Every 5 (Five) Minutes As Needed for Chest Pain. Take no more than 3 doses in 15 minutes., Disp: 25 tablet, Rfl: 12    pioglitazone (ACTOS) 30 MG tablet, Take 1 tablet by mouth Daily., Disp: , Rfl:     Trulicity 0.75 MG/0.5ML solution pen-injector, sundays, Disp: , Rfl:     Turmeric (QC TUMERIC COMPLEX PO), Take  by  mouth., Disp: , Rfl:    Assessment:                Plan:          ICD-10-CM ICD-9-CM   1. Chest pain, unspecified type  R07.9 786.50   2. Abnormal electrocardiogram  R94.31 794.31   3. Shortness of breath  R06.02 786.05     1. Chest pain, unspecified type  Workup is negative    2. Abnormal electrocardiogram  No angina pectoris    3. Shortness of breath  Under control      Specificity and sensitivity of the stress test/ cardiac workup has been explained. Pt has been explained if  Symptoms continue please go to ER, and further w/p will be required.    Also explained this does not rule out coronary artery disease or the future events, continue to emphasize on risk reductions for coronary artery disease    Pt also advised to contact PCP for other causes of symptoms    Prescriptions of the nitroglycerin and associated instructions has been given  Patient has been advised to use sublingual nitroglycerin for chest pain or equivalent symptoms, maximum of 3 with the 10 minutes interval.  If the symptoms persist patient has been advised to go to emergency room  Due to the risk of the hypotension patient has been advised to take the sublingual nitroglycerin while the patient in sitting position    6 MONTHS    COUNSELING:    Debbie Chavira was given to patient for the following topics: diagnostic results, risk factor reductions, impressions, risks and benefits of treatment options and importance of treatment compliance .       SMOKING COUNSELING:        Dictated using Dragon dictation

## 2024-05-24 ENCOUNTER — APPOINTMENT (OUTPATIENT)
Dept: GENERAL RADIOLOGY | Facility: HOSPITAL | Age: 74
End: 2024-05-24
Payer: MEDICARE

## 2024-05-24 ENCOUNTER — HOSPITAL ENCOUNTER (EMERGENCY)
Facility: HOSPITAL | Age: 74
Discharge: HOME | End: 2024-05-24
Attending: STUDENT IN AN ORGANIZED HEALTH CARE EDUCATION/TRAINING PROGRAM
Payer: MEDICARE

## 2024-05-24 ENCOUNTER — APPOINTMENT (OUTPATIENT)
Dept: CT IMAGING | Facility: HOSPITAL | Age: 74
End: 2024-05-24
Payer: MEDICARE

## 2024-05-24 VITALS
DIASTOLIC BLOOD PRESSURE: 61 MMHG | SYSTOLIC BLOOD PRESSURE: 146 MMHG | HEIGHT: 62 IN | HEART RATE: 90 BPM | BODY MASS INDEX: 33.68 KG/M2 | RESPIRATION RATE: 28 BRPM | TEMPERATURE: 99.7 F | OXYGEN SATURATION: 91 % | WEIGHT: 183 LBS

## 2024-05-24 DIAGNOSIS — R06.00 DYSPNEA, UNSPECIFIED TYPE: Primary | ICD-10-CM

## 2024-05-24 LAB
ALBUMIN SERPL-MCNC: 4.1 G/DL (ref 3.5–5.2)
ALBUMIN/GLOB SERPL: 1.4 G/DL
ALP SERPL-CCNC: 78 U/L (ref 39–117)
ALT SERPL W P-5'-P-CCNC: 19 U/L (ref 1–33)
ANION GAP SERPL CALCULATED.3IONS-SCNC: 12 MMOL/L (ref 5–15)
AST SERPL-CCNC: 14 U/L (ref 1–32)
BACTERIA UR QL AUTO: NORMAL /HPF
BASOPHILS # BLD AUTO: 0.02 10*3/MM3 (ref 0–0.2)
BASOPHILS NFR BLD AUTO: 0.2 % (ref 0–1.5)
BILIRUB SERPL-MCNC: 0.6 MG/DL (ref 0–1.2)
BILIRUB UR QL STRIP: NEGATIVE
BUN SERPL-MCNC: 13 MG/DL (ref 8–23)
BUN/CREAT SERPL: 12.6 (ref 7–25)
CALCIUM SPEC-SCNC: 9.3 MG/DL (ref 8.6–10.5)
CHLORIDE SERPL-SCNC: 102 MMOL/L (ref 98–107)
CLARITY UR: CLEAR
CO2 SERPL-SCNC: 25 MMOL/L (ref 22–29)
COLOR UR: YELLOW
CREAT SERPL-MCNC: 1.03 MG/DL (ref 0.57–1)
DEPRECATED RDW RBC AUTO: 45.6 FL (ref 37–54)
EGFRCR SERPLBLD CKD-EPI 2021: 57.9 ML/MIN/1.73
EOSINOPHIL # BLD AUTO: 0.31 10*3/MM3 (ref 0–0.4)
EOSINOPHIL NFR BLD AUTO: 2.8 % (ref 0.3–6.2)
ERYTHROCYTE [DISTWIDTH] IN BLOOD BY AUTOMATED COUNT: 13.6 % (ref 12.3–15.4)
GLOBULIN UR ELPH-MCNC: 2.9 GM/DL
GLUCOSE SERPL-MCNC: 168 MG/DL (ref 65–99)
GLUCOSE UR STRIP-MCNC: NEGATIVE MG/DL
HCT VFR BLD AUTO: 38.9 % (ref 34–46.6)
HGB BLD-MCNC: 12.8 G/DL (ref 12–15.9)
HGB UR QL STRIP.AUTO: NEGATIVE
HOLD SPECIMEN: NORMAL
HOLD SPECIMEN: NORMAL
HYALINE CASTS UR QL AUTO: NORMAL /LPF
IMM GRANULOCYTES # BLD AUTO: 0.02 10*3/MM3 (ref 0–0.05)
IMM GRANULOCYTES NFR BLD AUTO: 0.2 % (ref 0–0.5)
KETONES UR QL STRIP: ABNORMAL
LEUKOCYTE ESTERASE UR QL STRIP.AUTO: ABNORMAL
LYMPHOCYTES # BLD AUTO: 1.52 10*3/MM3 (ref 0.7–3.1)
LYMPHOCYTES NFR BLD AUTO: 13.8 % (ref 19.6–45.3)
MCH RBC QN AUTO: 30.7 PG (ref 26.6–33)
MCHC RBC AUTO-ENTMCNC: 32.9 G/DL (ref 31.5–35.7)
MCV RBC AUTO: 93.3 FL (ref 79–97)
MONOCYTES # BLD AUTO: 0.82 10*3/MM3 (ref 0.1–0.9)
MONOCYTES NFR BLD AUTO: 7.4 % (ref 5–12)
NEUTROPHILS NFR BLD AUTO: 75.6 % (ref 42.7–76)
NEUTROPHILS NFR BLD AUTO: 8.36 10*3/MM3 (ref 1.7–7)
NITRITE UR QL STRIP: NEGATIVE
NRBC BLD AUTO-RTO: 0 /100 WBC (ref 0–0.2)
NT-PROBNP SERPL-MCNC: 191 PG/ML (ref 0–900)
PH UR STRIP.AUTO: 7 [PH] (ref 5–8)
PLATELET # BLD AUTO: 179 10*3/MM3 (ref 140–450)
PMV BLD AUTO: 9.8 FL (ref 6–12)
POTASSIUM SERPL-SCNC: 3.9 MMOL/L (ref 3.5–5.2)
PROT SERPL-MCNC: 7 G/DL (ref 6–8.5)
PROT UR QL STRIP: NEGATIVE
RBC # BLD AUTO: 4.17 10*6/MM3 (ref 3.77–5.28)
RBC # UR STRIP: NORMAL /HPF
REF LAB TEST METHOD: NORMAL
SODIUM SERPL-SCNC: 139 MMOL/L (ref 136–145)
SP GR UR STRIP: >1.03 (ref 1–1.03)
SQUAMOUS #/AREA URNS HPF: NORMAL /HPF
TROPONIN T SERPL HS-MCNC: 16 NG/L
UROBILINOGEN UR QL STRIP: ABNORMAL
WBC # UR STRIP: NORMAL /HPF
WBC NRBC COR # BLD AUTO: 11.05 10*3/MM3 (ref 3.4–10.8)
WHOLE BLOOD HOLD COAG: NORMAL
WHOLE BLOOD HOLD SPECIMEN: NORMAL

## 2024-05-24 PROCEDURE — 83880 ASSAY OF NATRIURETIC PEPTIDE: CPT | Performed by: STUDENT IN AN ORGANIZED HEALTH CARE EDUCATION/TRAINING PROGRAM

## 2024-05-24 PROCEDURE — 99285 EMERGENCY DEPT VISIT HI MDM: CPT

## 2024-05-24 PROCEDURE — 71275 CT ANGIOGRAPHY CHEST: CPT

## 2024-05-24 PROCEDURE — 25510000001 IOPAMIDOL PER 1 ML: Performed by: STUDENT IN AN ORGANIZED HEALTH CARE EDUCATION/TRAINING PROGRAM

## 2024-05-24 PROCEDURE — 93005 ELECTROCARDIOGRAM TRACING: CPT | Performed by: STUDENT IN AN ORGANIZED HEALTH CARE EDUCATION/TRAINING PROGRAM

## 2024-05-24 PROCEDURE — 84484 ASSAY OF TROPONIN QUANT: CPT | Performed by: STUDENT IN AN ORGANIZED HEALTH CARE EDUCATION/TRAINING PROGRAM

## 2024-05-24 PROCEDURE — 80053 COMPREHEN METABOLIC PANEL: CPT | Performed by: STUDENT IN AN ORGANIZED HEALTH CARE EDUCATION/TRAINING PROGRAM

## 2024-05-24 PROCEDURE — 81001 URINALYSIS AUTO W/SCOPE: CPT | Performed by: STUDENT IN AN ORGANIZED HEALTH CARE EDUCATION/TRAINING PROGRAM

## 2024-05-24 PROCEDURE — 63710000001 PREDNISONE PER 1 MG: Performed by: STUDENT IN AN ORGANIZED HEALTH CARE EDUCATION/TRAINING PROGRAM

## 2024-05-24 PROCEDURE — 71045 X-RAY EXAM CHEST 1 VIEW: CPT

## 2024-05-24 PROCEDURE — 85025 COMPLETE CBC W/AUTO DIFF WBC: CPT | Performed by: STUDENT IN AN ORGANIZED HEALTH CARE EDUCATION/TRAINING PROGRAM

## 2024-05-24 RX ORDER — PREDNISONE 50 MG/1
50 TABLET ORAL DAILY
Qty: 5 TABLET | Refills: 0 | Status: SHIPPED | OUTPATIENT
Start: 2024-05-24 | End: 2024-05-29

## 2024-05-24 RX ORDER — SODIUM CHLORIDE 0.9 % (FLUSH) 0.9 %
10 SYRINGE (ML) INJECTION AS NEEDED
Status: DISCONTINUED | OUTPATIENT
Start: 2024-05-24 | End: 2024-05-24 | Stop reason: HOSPADM

## 2024-05-24 RX ORDER — PREDNISONE 20 MG/1
60 TABLET ORAL ONCE
Status: COMPLETED | OUTPATIENT
Start: 2024-05-24 | End: 2024-05-24

## 2024-05-24 RX ADMIN — IOPAMIDOL 85 ML: 755 INJECTION, SOLUTION INTRAVENOUS at 18:31

## 2024-05-24 RX ADMIN — PREDNISONE 60 MG: 20 TABLET ORAL at 20:36

## 2024-05-24 NOTE — Clinical Note
[facility] EMERGENCY DEPARTMENT  [address] [facility] [address]  Phone: [phone]    Darlene Carrasquillo accompanied Debbie Carrasquillo to the emergency department on 5/24/2024. They may return to work on 05/25/2024.        Thank you for choosing [facility].    Melanie Paniagua RN

## 2024-05-24 NOTE — Clinical Note
[facility] EMERGENCY DEPARTMENT  [address] [facility] [address]  Phone: [phone]    Debbie Carrasquillo was seen and treated in our emergency department on 5/24/2024.  She may return to work on 05/26/2024.         Thank you for choosing [facility].    Melanie Paniagua RN

## 2024-05-24 NOTE — Clinical Note
[facility] EMERGENCY DEPARTMENT  [address] [facility] [address]  Phone: [phone]    Darlene Carrasquillo accompanied Debbie Carrasquillo to the emergency department on 5/24/2024. They may return to work on 05/25/2024.        Thank you for choosing [facility].    Melanie Paniagua RN

## 2024-05-24 NOTE — DISCHARGE INSTRUCTIONS
Please follow-up with your primary care physician within 1 to 2 weeks for repeat evaluation    I have placed a outpatient referral order to pulmonology to continue your outpatient evaluation of shortness of breath.  They should contact you to schedule this appointment.  If you do not hear from them you may call them at the number listed in your paperwork for Dr. Jarrell    Please return to the ER with new or worsening symptoms including but not limited to chest pain, shortness of breath, lightheadedness, fevers, chills

## 2024-05-24 NOTE — ED PROVIDER NOTES
EMERGENCY DEPARTMENT ENCOUNTER  Room Number:  08/08  PCP: Alena Paz MD  Independent Historians: Patient and EMS      HPI:  Chief Complaint: had concerns including Shortness of Breath.     Context: Debbie Carrasquillo is a 73 y.o. female with a medical history of diabetes, hyperlipidemia, hypertension, CAD who presents to the ED c/o acute shortness of breath.  Symptoms been occurring for approximately 1 month.  Patient has been being evaluated on outpatient basis through cardiology without any clarification on the etiology of symptoms.  Patient was urgent care today and sent to the ER for further evaluation.  Patient denies chest pain, nausea, vomiting.  She states her primary concern is that she gets extraordinarily out of breath with any sort of exertion even a few steps.  EMS reports patient was 92% on room air and placed patient on 2 L O2 for comfort.      Review of prior external notes (non-ED) -and- Review of prior external test results outside of this encounter: Office visit with cardiology from 4/18/2024 reviewed and notable for visit secondary to chest pain, left arm pain and shortness of breath.  Plan at that time was to obtain stress test and echo and follow-up as needed.    Prescription drug monitoring program review:         PAST MEDICAL HISTORY  Active Ambulatory Problems     Diagnosis Date Noted    Chest pain 09/05/2018    Abnormal electrocardiogram 05/01/2024    Shortness of breath 05/01/2024     Resolved Ambulatory Problems     Diagnosis Date Noted    No Resolved Ambulatory Problems     Past Medical History:   Diagnosis Date    Cardiac abnormality     Diabetes mellitus     Hyperlipidemia     Hypertension          PAST SURGICAL HISTORY  Past Surgical History:   Procedure Laterality Date    CARDIAC CATHETERIZATION N/A 9/7/2018    Procedure: Left Heart Cath;  Surgeon: Victorina Scott MD;  Location: [facility] INVASIVE LOCATION;  Service: Cardiology    CARDIAC CATHETERIZATION N/A 9/7/2018     Procedure: Coronary angiography;  Surgeon: Victorina Scott MD;  Location: [facility] CATH INVASIVE LOCATION;  Service: Cardiology    CARDIAC CATHETERIZATION N/A 9/7/2018    Procedure: Left ventriculography;  Surgeon: Victorina Scott MD;  Location: [facility] CATH INVASIVE LOCATION;  Service: Cardiology         FAMILY HISTORY  Family History   Problem Relation Age of Onset    Heart disease Mother     Heart disease Sister     Heart disease Brother          SOCIAL HISTORY  Social History     Socioeconomic History    Marital status:    Tobacco Use    Smoking status: Former   Vaping Use    Vaping status: Never Used   Substance and Sexual Activity    Alcohol use: Yes     Alcohol/week: 1.0 standard drink of alcohol     Types: 1 Glasses of wine per week     Comment: OCC    Drug use: No    Sexual activity: Defer     Birth control/protection: Post-menopausal         ALLERGIES  Cyclobenzaprine, Glimepiride, Metformin, Novocain [procaine], Other, and Sulfamethoxazole-trimethoprim      REVIEW OF SYSTEMS  Review of Systems  Included in HPI  All systems reviewed and negative except for those discussed in HPI.      PHYSICAL EXAM    I have reviewed the triage vital signs and nursing notes.    ED Triage Vitals [05/24/24 1734]   Temp Heart Rate Resp BP SpO2   99.7 °F (37.6 °C) 92 28 168/89 99 %      Temp src Heart Rate Source Patient Position BP Location FiO2 (%)   Tympanic -- -- -- --       Physical Exam  GENERAL: alert, no acute distress  SKIN: Warm, dry  HENT: Normocephalic, atraumatic  EYES: no scleral icterus  CV: regular rhythm, regular rate, no lower extremity edema  RESPIRATORY: normal effort, lungs clear  ABDOMEN: soft, nontender, nondistended  MUSCULOSKELETAL: no deformity  NEURO: alert, moves all extremities, follows commands            LAB RESULTS  Recent Results (from the past 24 hour(s))   Comprehensive Metabolic Panel    Collection Time: 05/24/24  5:53 PM    Specimen: Blood   Result Value Ref Range     Glucose 168 (H) 65 - 99 mg/dL    BUN 13 8 - 23 mg/dL    Creatinine 1.03 (H) 0.57 - 1.00 mg/dL    Sodium 139 136 - 145 mmol/L    Potassium 3.9 3.5 - 5.2 mmol/L    Chloride 102 98 - 107 mmol/L    CO2 25.0 22.0 - 29.0 mmol/L    Calcium 9.3 8.6 - 10.5 mg/dL    Total Protein 7.0 6.0 - 8.5 g/dL    Albumin 4.1 3.5 - 5.2 g/dL    ALT (SGPT) 19 1 - 33 U/L    AST (SGOT) 14 1 - 32 U/L    Alkaline Phosphatase 78 39 - 117 U/L    Total Bilirubin 0.6 0.0 - 1.2 mg/dL    Globulin 2.9 gm/dL    A/G Ratio 1.4 g/dL    BUN/Creatinine Ratio 12.6 7.0 - 25.0    Anion Gap 12.0 5.0 - 15.0 mmol/L    eGFR 57.9 (L) >60.0 mL/min/1.73   BNP    Collection Time: 05/24/24  5:53 PM    Specimen: Blood   Result Value Ref Range    proBNP 191.0 0.0 - 900.0 pg/mL   Single High Sensitivity Troponin T    Collection Time: 05/24/24  5:53 PM    Specimen: Blood   Result Value Ref Range    HS Troponin T 16 (H) <14 ng/L   Green Top (Gel)    Collection Time: 05/24/24  5:53 PM   Result Value Ref Range    Extra Tube Hold for add-ons.    Lavender Top    Collection Time: 05/24/24  5:53 PM   Result Value Ref Range    Extra Tube hold for add-on    Gold Top - SST    Collection Time: 05/24/24  5:53 PM   Result Value Ref Range    Extra Tube Hold for add-ons.    Light Blue Top    Collection Time: 05/24/24  5:53 PM   Result Value Ref Range    Extra Tube Hold for add-ons.    CBC Auto Differential    Collection Time: 05/24/24  5:53 PM    Specimen: Blood   Result Value Ref Range    WBC 11.05 (H) 3.40 - 10.80 10*3/mm3    RBC 4.17 3.77 - 5.28 10*6/mm3    Hemoglobin 12.8 12.0 - 15.9 g/dL    Hematocrit 38.9 34.0 - 46.6 %    MCV 93.3 79.0 - 97.0 fL    MCH 30.7 26.6 - 33.0 pg    MCHC 32.9 31.5 - 35.7 g/dL    RDW 13.6 12.3 - 15.4 %    RDW-SD 45.6 37.0 - 54.0 fl    MPV 9.8 6.0 - 12.0 fL    Platelets 179 140 - 450 10*3/mm3    Neutrophil % 75.6 42.7 - 76.0 %    Lymphocyte % 13.8 (L) 19.6 - 45.3 %    Monocyte % 7.4 5.0 - 12.0 %    Eosinophil % 2.8 0.3 - 6.2 %    Basophil % 0.2 0.0 - 1.5 %     Immature Grans % 0.2 0.0 - 0.5 %    Neutrophils, Absolute 8.36 (H) 1.70 - 7.00 10*3/mm3    Lymphocytes, Absolute 1.52 0.70 - 3.10 10*3/mm3    Monocytes, Absolute 0.82 0.10 - 0.90 10*3/mm3    Eosinophils, Absolute 0.31 0.00 - 0.40 10*3/mm3    Basophils, Absolute 0.02 0.00 - 0.20 10*3/mm3    Immature Grans, Absolute 0.02 0.00 - 0.05 10*3/mm3    nRBC 0.0 0.0 - 0.2 /100 WBC   ECG 12 Lead Dyspnea    Collection Time: 05/24/24  5:53 PM   Result Value Ref Range    QT Interval 357 ms    QTC Interval 442 ms   Urinalysis With Microscopic If Indicated (No Culture) - Urine, Clean Catch    Collection Time: 05/24/24  7:00 PM    Specimen: Urine, Clean Catch   Result Value Ref Range    Color, UA Yellow Yellow, Straw    Appearance, UA Clear Clear    pH, UA 7.0 5.0 - 8.0    Specific Gravity, UA >1.030 (H) 1.005 - 1.030    Glucose, UA Negative Negative    Ketones, UA Trace (A) Negative    Bilirubin, UA Negative Negative    Blood, UA Negative Negative    Protein, UA Negative Negative    Leuk Esterase, UA Trace (A) Negative    Nitrite, UA Negative Negative    Urobilinogen, UA 1.0 E.U./dL 0.2 - 1.0 E.U./dL   Urinalysis, Microscopic Only - Urine, Clean Catch    Collection Time: 05/24/24  7:00 PM    Specimen: Urine, Clean Catch   Result Value Ref Range    RBC, UA 0-2 None Seen, 0-2 /HPF    WBC, UA 0-2 None Seen, 0-2 /HPF    Bacteria, UA None Seen None Seen /HPF    Squamous Epithelial Cells, UA 0-2 None Seen, 0-2 /HPF    Hyaline Casts, UA None Seen None Seen /LPF    Methodology Automated Microscopy          RADIOLOGY  CT Angiogram Chest Pulmonary Embolism    Result Date: 5/24/2024  CT ANGIOGRAM CHEST PULMONARY EMBOLISM  TECHNIQUE: Radiation dose reduction techniques were utilized, including automated exposure control and exposure modulation based on body size. 2 mm images were obtained through the chest after the administration of IV contrast. 3D reformat images were. Multiple coronal, sagittal, and 3-D reconstruction images were  obtained.  HISTORY: Shortness of breath for approximately 1 month.  COMPARISON: CT abdomen pelvis 10/31/2021    FINDINGS: Nondilated main pulmonary artery. Pulmonary arteries are patent.  Bihilar lymphadenopathy measuring up to 1.6 cm on the right (series 4/image 53 and 1.3 cm on the left (series 4/image 48). Subcarinal lymph node at the upper limits of normal measuring 1 cm (series 4/image 55).  Heart is normal in size. Severe coronary artery calcifications. Trace pericardial fluid. Nondilated thoracic aorta. Decompressed esophagus.  Trachea and main bronchi are patent. No bronchiectasis or bronchial wall thickening. Diffuse moderate interlobular septal thickening with slight basilar gradient. Intermixed lingular and bibasilar predominant subsegmental linear atelectasis/scarring. Few calcified granulomas. No pleural effusion.        1. No evidence of pulmonary thromboembolism. 2. Diffuse moderate interlobular septal thickening with slight basilar gradient may represent pulmonary edema. Recommend follow-up CT chest in 2 to 3 months to ensure improvement/resolution. 3. Bilateral hilar lymphadenopathy is of indeterminate etiology. Recommend attention on follow-up chest CT. Subcarinal lymph nodes are at the upper limits of normal for size. 4. Severe coronary calcifications.     This report was finalized on 5/24/2024 7:31 PM by Dr. Daniel Friedman M.D on Workstation: [employer] Chest 1 View    Result Date: 5/24/2024  Emergency portable view of the chest on 5/24/2024  CLINICAL HISTORY: Shortness of breath, CHF COPD protocol  This correlated to a prior chest x-ray on 9/5/2018.  FINDINGS: The cardiomediastinal silhouette and the pulmonary vasculature are within normal limits. There is some minimal linear opacity in the medial right lung base and lateral aspect left lung base likely areas of atelectasis or scarring. The remainder lungs are clear. The costophrenic angles are sharp.      1. No definite active disease  is seen in the chest. There are some bibasilar linear opacities that are likely linear areas of atelectasis or scarring.  This report was finalized on 5/24/2024 6:45 PM by Dr. Renato Tran M.D on Workstation: [id number]         MEDICATIONS GIVEN IN ER  Medications   sodium chloride 0.9 % flush 10 mL (has no administration in time range)   predniSONE (DELTASONE) tablet 60 mg (has no administration in time range)   iopamidol (ISOVUE-370) 76 % injection 100 mL (85 mL Intravenous Given by Other 5/24/24 1831)         ORDERS PLACED DURING THIS VISIT:  Orders Placed This Encounter   Procedures    XR Chest 1 View    CT Angiogram Chest Pulmonary Embolism    [place] Draw    Comprehensive Metabolic Panel    BNP    Single High Sensitivity Troponin T    CBC Auto Differential    Urinalysis With Microscopic If Indicated (No Culture) - Urine, Clean Catch    Urinalysis, Microscopic Only - Urine, Clean Catch    Ambulatory Referral to Pulmonology    Continuous Pulse Oximetry    Vital Signs    Oxygen Therapy- Nasal Cannula; Titrate 1-6 LPM Per SpO2; 90 - 95%    ECG 12 Lead Dyspnea    Insert Peripheral IV    CBC & Differential    Green Top (Gel)    Lavender Top    Gold Top - SST    Light Blue Top         OUTPATIENT MEDICATION MANAGEMENT:  Current Facility-Administered Medications Ordered in Epic   Medication Dose Route Frequency Provider Last Rate Last Admin    predniSONE (DELTASONE) tablet 60 mg  60 mg Oral Once Daniel Armando MD        sodium chloride 0.9 % flush 10 mL  10 mL Intravenous PRN Daniel Armando MD         Current Outpatient Medications Ordered in Epic   Medication Sig Dispense Refill    aspirin  MG tablet Take 1 tablet by mouth Daily.      atorvastatin (LIPITOR) 40 MG tablet Take 1 tablet by mouth Daily.      furosemide (LASIX) 40 MG tablet Take 1 tablet by mouth Daily.      isosorbide mononitrate (IMDUR) 30 MG 24 hr tablet Take 0.5 tablets by mouth Daily. 15 tablet 6    pioglitazone (ACTOS) 30 MG tablet  Take 1 tablet by mouth Daily.      Trulicity 0.75 MG/0.5ML solution pen-injector sundays      nitrofurantoin, macrocrystal-monohydrate, (MACROBID) 100 MG capsule TAKE 1 CAPSULE BY MOUTH TWO TIMES A DAY FOR 7 DAYS      nitroglycerin (NITROSTAT) 0.4 MG SL tablet Place 1 tablet under the tongue Every 5 (Five) Minutes As Needed for Chest Pain. Take no more than 3 doses in 15 minutes. 25 tablet 12    predniSONE (DELTASONE) 50 MG tablet Take 1 tablet by mouth Daily for 5 days. 5 tablet 0    Turmeric (QC TUMERIC COMPLEX PO) Take  by mouth.           PROGRESS, DATA ANALYSIS, CONSULTS, AND MEDICAL DECISION MAKING  All labs have been independently interpreted by me.  All radiology studies have been reviewed by me. All EKG's have been independently viewed and interpreted by me.  Discussion below represents my analysis of pertinent findings related to patient's condition, differential diagnosis, treatment plan and final disposition.    Differential diagnosis includes but is not limited to PE, viral URI, interstitial lung disease, CHF.    Clinical Scores:                   ED Course as of 05/24/24 1942   Fri May 24, 2024   1756 EKG interpreted by me demonstrates sinus rhythm, rate 92, no [place]/QT prolongation, no ST elevation, nonspecific changes in inferior leads [MW]   1829 WBC(!): 11.05 [MW]   1830 HS Troponin T(!): 16 [MW]   1847 CT PE interpreted by me and demonstrates no evidence of pulmonary embolism [MW]   1942 Workup in the emergency department is overall unremarkable including CT PE with no evidence of pneumonia or pulmonary embolism.  There is some bronchial thickening of unclear etiology.  Will initiate short course of steroids to see if this helps patient symptomatically.  Patient's vital signs are stable with no need for supplementary O2.  Patient has been undergoing extensive outpatient workup with cardiology for the shortness of breath.  Will additionally place referral to pulmonology and counseled patient on  supportive care measures, return precautions.  Patient discharged in stable condition. [MW]      ED Course User Index  [MW] Daniel Armando MD             AS OF 19:42 EDT VITALS:    BP - 149/85  HR - 97  TEMP - 99.7 °F (37.6 °C) (Tympanic)  O2 SATS - 94%    COMPLEXITY OF CARE  Admission was considered but after careful review of the patient's presentation, physical examination, diagnostic results, and response to treatment the patient may be safely discharged with outpatient follow-up.      DIAGNOSIS  Final diagnoses:   Dyspnea, unspecified type         DISPOSITION  ED Disposition       ED Disposition   Discharge    Condition   Stable    Comment   --                Please note that portions of this document were completed with a voice recognition program.    Note Disclaimer: At [facility], we believe that sharing information builds trust and better relationships. You are receiving this note because you recently visited [facility]. It is possible you will see health information before a provider has talked with you about it. This kind of information can be easy to misunderstand. To help you fully understand what it means for your health, we urge you to discuss this note with your provider.         Daniel Armando MD  05/24/24 1943

## 2024-05-24 NOTE — Clinical Note
[facility] EMERGENCY DEPARTMENT  [address] [facility] [address]  Phone: [phone]    Debbie Carrasquillo was seen and treated in our emergency department on 5/24/2024.  She may return to work on 05/26/2024.         Thank you for choosing [facility].    Melanie Paniagua RN

## 2024-05-24 NOTE — ED TRIAGE NOTES
Pt to er from [facility] 2/2 soa x 1mo. Has had stress test and echo 2 weeks ago.

## 2024-05-24 NOTE — Clinical Note
[facility] EMERGENCY DEPARTMENT  [address] [facility] [address]  Phone: [phone]    Debbie Carrasquillo was seen and treated in our emergency department on 5/24/2024.  She may return to work on 05/26/2024.         Thank you for choosing [facility].    Melanie Paniagua RN

## 2024-05-26 LAB
QT INTERVAL: 357 MS
QTC INTERVAL: 442 MS

## 2025-05-23 ENCOUNTER — HOSPITAL ENCOUNTER (OUTPATIENT)
Facility: HOSPITAL | Age: 75
Setting detail: OBSERVATION
Discharge: HOME | End: 2025-05-25
Attending: STUDENT IN AN ORGANIZED HEALTH CARE EDUCATION/TRAINING PROGRAM | Admitting: EMERGENCY MEDICINE
Payer: MEDICARE

## 2025-05-23 ENCOUNTER — APPOINTMENT (OUTPATIENT)
Dept: GENERAL RADIOLOGY | Facility: HOSPITAL | Age: 75
End: 2025-05-23
Payer: MEDICARE

## 2025-05-23 DIAGNOSIS — M25.561 ACUTE PAIN OF RIGHT KNEE: Primary | ICD-10-CM

## 2025-05-23 DIAGNOSIS — R26.89 UNABLE TO BEAR WEIGHT ON RIGHT LOWER EXTREMITY: ICD-10-CM

## 2025-05-23 PROBLEM — M25.569 KNEE PAIN, ACUTE: Status: ACTIVE | Noted: 2025-05-23

## 2025-05-23 LAB
ALBUMIN SERPL-MCNC: 4 G/DL (ref 3.5–5.2)
ALBUMIN/GLOB SERPL: 1.4 G/DL
ALP SERPL-CCNC: 72 U/L (ref 39–117)
ALT SERPL W P-5'-P-CCNC: 26 U/L (ref 1–33)
ANION GAP SERPL CALCULATED.3IONS-SCNC: 9.6 MMOL/L (ref 5–15)
AST SERPL-CCNC: 30 U/L (ref 1–32)
BASOPHILS # BLD AUTO: 0.06 10*3/MM3 (ref 0–0.2)
BASOPHILS NFR BLD AUTO: 0.8 % (ref 0–1.5)
BILIRUB SERPL-MCNC: 0.3 MG/DL (ref 0–1.2)
BUN SERPL-MCNC: 20 MG/DL (ref 8–23)
BUN/CREAT SERPL: 19 (ref 7–25)
CALCIUM SPEC-SCNC: 9.6 MG/DL (ref 8.6–10.5)
CHLORIDE SERPL-SCNC: 102 MMOL/L (ref 98–107)
CO2 SERPL-SCNC: 24.4 MMOL/L (ref 22–29)
CREAT SERPL-MCNC: 1.05 MG/DL (ref 0.57–1)
DEPRECATED RDW RBC AUTO: 48.2 FL (ref 37–54)
EGFRCR SERPLBLD CKD-EPI 2021: 56.2 ML/MIN/1.73
EOSINOPHIL # BLD AUTO: 0.12 10*3/MM3 (ref 0–0.4)
EOSINOPHIL NFR BLD AUTO: 1.7 % (ref 0.3–6.2)
ERYTHROCYTE [DISTWIDTH] IN BLOOD BY AUTOMATED COUNT: 14 % (ref 12.3–15.4)
GLOBULIN UR ELPH-MCNC: 2.8 GM/DL
GLUCOSE SERPL-MCNC: 112 MG/DL (ref 65–99)
HBA1C MFR BLD: 7.4 % (ref 4.8–5.6)
HCT VFR BLD AUTO: 37.2 % (ref 34–46.6)
HGB BLD-MCNC: 12.4 G/DL (ref 12–15.9)
IMM GRANULOCYTES # BLD AUTO: 0.02 10*3/MM3 (ref 0–0.05)
IMM GRANULOCYTES NFR BLD AUTO: 0.3 % (ref 0–0.5)
LYMPHOCYTES # BLD AUTO: 1.35 10*3/MM3 (ref 0.7–3.1)
LYMPHOCYTES NFR BLD AUTO: 18.8 % (ref 19.6–45.3)
MCH RBC QN AUTO: 31.4 PG (ref 26.6–33)
MCHC RBC AUTO-ENTMCNC: 33.3 G/DL (ref 31.5–35.7)
MCV RBC AUTO: 94.2 FL (ref 79–97)
MONOCYTES # BLD AUTO: 0.57 10*3/MM3 (ref 0.1–0.9)
MONOCYTES NFR BLD AUTO: 7.9 % (ref 5–12)
NEUTROPHILS NFR BLD AUTO: 5.06 10*3/MM3 (ref 1.7–7)
NEUTROPHILS NFR BLD AUTO: 70.5 % (ref 42.7–76)
NRBC BLD AUTO-RTO: 0 /100 WBC (ref 0–0.2)
PLATELET # BLD AUTO: 175 10*3/MM3 (ref 140–450)
PMV BLD AUTO: 10 FL (ref 6–12)
POTASSIUM SERPL-SCNC: 4.5 MMOL/L (ref 3.5–5.2)
PROT SERPL-MCNC: 6.8 G/DL (ref 6–8.5)
RBC # BLD AUTO: 3.95 10*6/MM3 (ref 3.77–5.28)
SODIUM SERPL-SCNC: 136 MMOL/L (ref 136–145)
WBC NRBC COR # BLD AUTO: 7.18 10*3/MM3 (ref 3.4–10.8)

## 2025-05-23 PROCEDURE — 73560 X-RAY EXAM OF KNEE 1 OR 2: CPT

## 2025-05-23 PROCEDURE — 85025 COMPLETE CBC W/AUTO DIFF WBC: CPT | Performed by: STUDENT IN AN ORGANIZED HEALTH CARE EDUCATION/TRAINING PROGRAM

## 2025-05-23 PROCEDURE — 25010000002 ONDANSETRON PER 1 MG: Performed by: STUDENT IN AN ORGANIZED HEALTH CARE EDUCATION/TRAINING PROGRAM

## 2025-05-23 PROCEDURE — 25010000002 MORPHINE PER 10 MG: Performed by: STUDENT IN AN ORGANIZED HEALTH CARE EDUCATION/TRAINING PROGRAM

## 2025-05-23 PROCEDURE — 96375 TX/PRO/DX INJ NEW DRUG ADDON: CPT

## 2025-05-23 PROCEDURE — G0378 HOSPITAL OBSERVATION PER HR: HCPCS

## 2025-05-23 PROCEDURE — 96374 THER/PROPH/DIAG INJ IV PUSH: CPT

## 2025-05-23 PROCEDURE — 83036 HEMOGLOBIN GLYCOSYLATED A1C: CPT | Performed by: NURSE PRACTITIONER

## 2025-05-23 PROCEDURE — 99285 EMERGENCY DEPT VISIT HI MDM: CPT

## 2025-05-23 PROCEDURE — 80053 COMPREHEN METABOLIC PANEL: CPT | Performed by: STUDENT IN AN ORGANIZED HEALTH CARE EDUCATION/TRAINING PROGRAM

## 2025-05-23 RX ORDER — ONDANSETRON 2 MG/ML
4 INJECTION INTRAMUSCULAR; INTRAVENOUS EVERY 6 HOURS PRN
Status: DISCONTINUED | OUTPATIENT
Start: 2025-05-23 | End: 2025-05-25 | Stop reason: HOSPADM

## 2025-05-23 RX ORDER — ONDANSETRON 2 MG/ML
4 INJECTION INTRAMUSCULAR; INTRAVENOUS ONCE
Status: COMPLETED | OUTPATIENT
Start: 2025-05-23 | End: 2025-05-23

## 2025-05-23 RX ORDER — SODIUM CHLORIDE 0.9 % (FLUSH) 0.9 %
10 SYRINGE (ML) INJECTION AS NEEDED
Status: DISCONTINUED | OUTPATIENT
Start: 2025-05-23 | End: 2025-05-25 | Stop reason: HOSPADM

## 2025-05-23 RX ORDER — SODIUM CHLORIDE 0.9 % (FLUSH) 0.9 %
10 SYRINGE (ML) INJECTION EVERY 12 HOURS SCHEDULED
Status: DISCONTINUED | OUTPATIENT
Start: 2025-05-23 | End: 2025-05-25 | Stop reason: HOSPADM

## 2025-05-23 RX ORDER — ONDANSETRON 4 MG/1
4 TABLET, ORALLY DISINTEGRATING ORAL EVERY 6 HOURS PRN
Status: DISCONTINUED | OUTPATIENT
Start: 2025-05-23 | End: 2025-05-25 | Stop reason: HOSPADM

## 2025-05-23 RX ORDER — MORPHINE SULFATE 2 MG/ML
4 INJECTION, SOLUTION INTRAMUSCULAR; INTRAVENOUS ONCE
Status: COMPLETED | OUTPATIENT
Start: 2025-05-23 | End: 2025-05-23

## 2025-05-23 RX ORDER — SODIUM CHLORIDE 9 MG/ML
40 INJECTION, SOLUTION INTRAVENOUS AS NEEDED
Status: DISCONTINUED | OUTPATIENT
Start: 2025-05-23 | End: 2025-05-25 | Stop reason: HOSPADM

## 2025-05-23 RX ADMIN — MORPHINE SULFATE 4 MG: 2 INJECTION, SOLUTION INTRAMUSCULAR; INTRAVENOUS at 21:25

## 2025-05-23 RX ADMIN — ONDANSETRON 4 MG: 2 INJECTION, SOLUTION INTRAMUSCULAR; INTRAVENOUS at 21:25

## 2025-05-23 NOTE — ED TRIAGE NOTES
Pt to triage from home via [facility] [id number] with c/o right knee pain x 2 weeks, states went to pmd yesterday who ruled out a blood clot, said something about varicose veins.  Pt states today was at the sink, went to turn and had pain behind the right knee.

## 2025-05-24 ENCOUNTER — APPOINTMENT (OUTPATIENT)
Dept: MRI IMAGING | Facility: HOSPITAL | Age: 75
End: 2025-05-24
Payer: MEDICARE

## 2025-05-24 LAB
GLUCOSE BLDC GLUCOMTR-MCNC: 163 MG/DL (ref 70–130)
GLUCOSE BLDC GLUCOMTR-MCNC: 87 MG/DL (ref 70–130)
GLUCOSE BLDC GLUCOMTR-MCNC: 96 MG/DL (ref 70–130)

## 2025-05-24 PROCEDURE — 82948 REAGENT STRIP/BLOOD GLUCOSE: CPT

## 2025-05-24 PROCEDURE — 96375 TX/PRO/DX INJ NEW DRUG ADDON: CPT

## 2025-05-24 PROCEDURE — 97162 PT EVAL MOD COMPLEX 30 MIN: CPT

## 2025-05-24 PROCEDURE — 25010000002 ONDANSETRON PER 1 MG: Performed by: NURSE PRACTITIONER

## 2025-05-24 PROCEDURE — G0378 HOSPITAL OBSERVATION PER HR: HCPCS

## 2025-05-24 PROCEDURE — 73721 MRI JNT OF LWR EXTRE W/O DYE: CPT

## 2025-05-24 PROCEDURE — 97530 THERAPEUTIC ACTIVITIES: CPT

## 2025-05-24 PROCEDURE — 96376 TX/PRO/DX INJ SAME DRUG ADON: CPT

## 2025-05-24 PROCEDURE — 25010000002 KETOROLAC TROMETHAMINE PER 15 MG: Performed by: ORTHOPAEDIC SURGERY

## 2025-05-24 RX ORDER — ISOSORBIDE MONONITRATE 30 MG/1
15 TABLET, EXTENDED RELEASE ORAL DAILY
Status: DISCONTINUED | OUTPATIENT
Start: 2025-05-24 | End: 2025-05-25 | Stop reason: HOSPADM

## 2025-05-24 RX ORDER — HYDROCODONE BITARTRATE AND ACETAMINOPHEN 5; 325 MG/1; MG/1
1 TABLET ORAL EVERY 6 HOURS PRN
Refills: 0 | Status: DISCONTINUED | OUTPATIENT
Start: 2025-05-24 | End: 2025-05-25 | Stop reason: HOSPADM

## 2025-05-24 RX ORDER — METOCLOPRAMIDE 5 MG/1
10 TABLET ORAL ONCE
Status: COMPLETED | OUTPATIENT
Start: 2025-05-24 | End: 2025-05-24

## 2025-05-24 RX ORDER — LEVOTHYROXINE SODIUM 50 UG/1
50 TABLET ORAL DAILY
COMMUNITY
Start: 2025-04-03 | End: 2025-09-30

## 2025-05-24 RX ORDER — TRAMADOL HYDROCHLORIDE 50 MG/1
50 TABLET ORAL EVERY 12 HOURS PRN
Qty: 14 TABLET | Refills: 0 | Status: SHIPPED | OUTPATIENT
Start: 2025-05-24 | End: 2025-05-25

## 2025-05-24 RX ORDER — KETOROLAC TROMETHAMINE 15 MG/ML
15 INJECTION, SOLUTION INTRAMUSCULAR; INTRAVENOUS ONCE
Status: COMPLETED | OUTPATIENT
Start: 2025-05-24 | End: 2025-05-24

## 2025-05-24 RX ORDER — PIOGLITAZONE 30 MG/1
30 TABLET ORAL DAILY
Status: DISCONTINUED | OUTPATIENT
Start: 2025-05-24 | End: 2025-05-25 | Stop reason: HOSPADM

## 2025-05-24 RX ORDER — LEVOTHYROXINE SODIUM 50 UG/1
50 TABLET ORAL
Status: DISCONTINUED | OUTPATIENT
Start: 2025-05-24 | End: 2025-05-25 | Stop reason: HOSPADM

## 2025-05-24 RX ADMIN — ISOSORBIDE MONONITRATE 15 MG: 30 TABLET, EXTENDED RELEASE ORAL at 10:30

## 2025-05-24 RX ADMIN — LEVOTHYROXINE SODIUM 50 MCG: 50 TABLET ORAL at 05:30

## 2025-05-24 RX ADMIN — HYDROCODONE BITARTRATE AND ACETAMINOPHEN 1 TABLET: 5; 325 TABLET ORAL at 03:30

## 2025-05-24 RX ADMIN — Medication 10 ML: at 09:36

## 2025-05-24 RX ADMIN — METOCLOPRAMIDE 10 MG: 5 TABLET ORAL at 09:40

## 2025-05-24 RX ADMIN — PIOGLITAZONE HYDROCHLORIDE 30 MG: 30 TABLET ORAL at 10:30

## 2025-05-24 RX ADMIN — KETOROLAC TROMETHAMINE 15 MG: 15 INJECTION INTRAMUSCULAR at 09:15

## 2025-05-24 RX ADMIN — Medication 10 ML: at 00:55

## 2025-05-24 RX ADMIN — ONDANSETRON 4 MG: 2 INJECTION, SOLUTION INTRAMUSCULAR; INTRAVENOUS at 05:30

## 2025-05-24 RX ADMIN — Medication 10 ML: at 20:21

## 2025-05-24 NOTE — THERAPY EVALUATION
Patient Name: Debbie Carrasquillo  : 1950    MRN: [id number]                              Today's Date: 2025       Admit Date: 2025    Visit Dx:     ICD-10-CM ICD-9-CM   1. Acute pain of right knee  M25.561 719.46   2. Unable to bear weight on right lower extremity  R26.89 781.2     Patient Active Problem List   Diagnosis    Chest pain    Abnormal electrocardiogram    Shortness of breath    Knee pain, acute     Past Medical History:   Diagnosis Date    Cardiac abnormality     stent placment    Diabetes mellitus     Hyperlipidemia     Hypertension      Past Surgical History:   Procedure Laterality Date    CARDIAC CATHETERIZATION N/A 2018    Procedure: Left Heart Cath;  Surgeon: Victorina Scott MD;  [facility];  Service: Cardiology    CARDIAC CATHETERIZATION N/A 2018    Procedure: Coronary angiography;  Surgeon: Victorina Scott MD;  [facility];  Service: Cardiology    CARDIAC CATHETERIZATION N/A 2018    Procedure: Left ventriculography;  Surgeon: Victorina Scott MD;  [facility];  Service: Cardiology      General Information       Row Name 25 1036          Physical Therapy Time and Intention    Document Type evaluation  -LW     Mode of Treatment individual therapy;physical therapy  -       Row Name 25 1036          General Information    Patient Profile Reviewed yes  -LW     Prior Level of Function independent:  -LW     Existing Precautions/Restrictions fall  -LW     Barriers to Rehab none identified  -       Row Name 25 1036          Living Environment    Current Living Arrangements home  -LW     People in Home child(ulysses), adult  daughter  -LW       Row Name 25 1036          Home Main Entrance    Number of Stairs, Main Entrance four  -LW     Stair Railings, Main Entrance railings safe and in good condition  -LW       Row Name 25 1036          Stairs  Within Home, Primary    Number of Stairs, Within Home, Primary none  -LW       Row Name 05/24/25 1036          Cognition    Orientation Status (Cognition) oriented x 4  -LW       Row Name 05/24/25 1036          Safety Issues/Impairments Affecting Functional Mobility    Impairments Affecting Function (Mobility) pain;endurance/activity tolerance  -LW               User Key  (r) = Recorded By, (t) = Taken By, (c) = Cosigned By      Initials Name Provider Type    Rox Coon PT Physical Therapist                   Mobility       Row Name 05/24/25 1038          Bed Mobility    Bed Mobility supine-sit;sit-supine  -LW     Supine-Sit [place] (Bed Mobility) standby assist  -LW     Sit-Supine [place] (Bed Mobility) standby assist  -LW     Assistive Device (Bed Mobility) head of bed elevated  -LW       Row Name 05/24/25 1038          Sit-Stand Transfer    Sit-Stand [place] (Transfers) minimum assist (75% patient effort)  -LW     Assistive Device (Sit-Stand Transfers) walker, front-wheeled  -LW       Row Name 05/24/25 1038          Gait/Stairs (Locomotion)    [place] Level (Gait) contact guard  -LW     Assistive Device (Gait) walker, front-wheeled  -LW     Patient was able to Ambulate yes  -LW     Distance in Feet (Gait) 15  x2  -LW     Deviations/Abnormal Patterns (Gait) base of support, wide;gait speed decreased;antalgic  -LW     Bilateral Gait Deviations forward flexed posture  -LW     Comment, (Gait/Stairs) unable to bear weight through RLE  -LW               User Key  (r) = Recorded By, (t) = Taken By, (c) = Cosigned By      Initials Name Provider Type    Rox Coon PT Physical Therapist                   Obj/Interventions       Row Name 05/24/25 1041          Range of Motion Comprehensive    General Range of Motion lower extremity range of motion deficits identified  -LW     Comment, General Range of Motion Unable to flex R knee  -LW       Row Name 05/24/25 1041          Strength  Comprehensive (MMT)    General Manual Muscle Testing (MMT) Assessment lower extremity strength deficits identified  -LW     Comment, General Manual Muscle Testing (MMT) Assessment generalized weakness RLE secondary to pain  -LW               User Key  (r) = Recorded By, (t) = Taken By, (c) = Cosigned By      Initials Name Provider Type    Rox Coon, PT Physical Therapist                   Goals/Plan       Row Name 05/24/25 1045          Bed Mobility Goal 1 (PT)    Activity/Assistive Device (Bed Mobility Goal 1, PT) bed mobility activities, all  -LW     [place] Level/Cues Needed (Bed Mobility Goal 1, PT) independent  -LW     Time Frame (Bed Mobility Goal 1, PT) 10 days  -LW       Row Name 05/24/25 1045          Transfer Goal 1 (PT)    Activity/Assistive Device (Transfer Goal 1, PT) sit-to-stand/stand-to-sit;bed-to-chair/chair-to-bed  -LW     [place] Level/Cues Needed (Transfer Goal 1, PT) modified independence  -LW     Time Frame (Transfer Goal 1, PT) 10 days  -LW       Row Name 05/24/25 1045          Gait Training Goal 1 (PT)    Activity/Assistive Device (Gait Training Goal 1, PT) gait (walking locomotion);walker, rolling  -LW     [place] Level (Gait Training Goal 1, PT) modified independence  -LW     Distance (Gait Training Goal 1, PT) 40  -LW     Time Frame (Gait Training Goal 1, PT) 10 days  -LW       Row Name 05/24/25 1045          Therapy Assessment/Plan (PT)    Planned Therapy Interventions (PT) balance training;bed mobility training;gait training;home exercise program;stretching;strengthening;stair training;ROM (range of motion);patient/family education;neuromuscular re-education;transfer training  -LW               User Key  (r) = Recorded By, (t) = Taken By, (c) = Cosigned By      Initials Name Provider Type    Rox Coon PT Physical Therapist                   Clinical Impression       Row Name 05/24/25 1042          Pain    Pretreatment Pain Rating 3/10  -LW     Posttreatment  Pain Rating 5/10  -LW     Pain Location knee  -LW     Pain Side/Orientation right  -LW     Pain Management Interventions activity modification encouraged  -LW     Response to Pain Interventions activity participation with tolerable pain  -LW       Row Name 05/24/25 1042          Plan of Care Review    Plan of Care Reviewed With patient  -LW     Progress no change  -LW     Outcome Evaluation Pt is a 75 y/o female admitted to [facility] for R knee pain. Prior to admission she was indep with all mobility and lives with her daughter wit 4 TIFFANIE. Today she presented with mobility below baseline, decreased tolerance to activity, and increased R knee pain. She completed bed mobiltiy SBA, stood with Oz and rwx, and ambulated 15' to the BR with CGA and rwx. She was unable to bear weight through her RLE this date. Patient will benefit from continued skilled PT addressing limitations in functional mobility to maximize safety and independence. Therapy recommendations include OPPT if needed at d/c.  -LW       Row Name 05/24/25 1042          Therapy Assessment/Plan (PT)    Rehab Potential (PT) good  -LW     Criteria for Skilled Interventions Met (PT) yes  -LW     Therapy Frequency (PT) 5 times/wk  -LW       Row Name 05/24/25 1042          Positioning and Restraints    Pre-Treatment Position in bed  -LW     Post Treatment Position bed  -LW     In Bed supine;call light within reach;encouraged to call for assist;notified nsg  -LW               User Key  (r) = Recorded By, (t) = Taken By, (c) = Cosigned By      Initials Name Provider Type    LW Rox Woodson, PT Physical Therapist                   Outcome Measures       Row Name 05/24/25 1046 05/24/25 1000       How much help from another person do you currently need...    Turning from your back to your side while in flat bed without using bedrails? 4  -LW 3  -LWA    Moving from lying on back to sitting on the side of a flat bed without bedrails? 4  -LW 3  -LWA    Moving to and from a bed  to a chair (including a wheelchair)? 3  -LW 3  -LWA    Standing up from a chair using your arms (e.g., wheelchair, bedside chair)? 3  -LW 3  -LWA    Climbing 3-5 steps with a railing? 2  -LW 2  -LWA    To walk in hospital room? 3  -LW 2  -LWA    AM-PAC 6 Clicks Score (PT) 19  -LW 16  -LWA    Highest Level of Mobility Goal Walk 10 Steps or More-6  -LW Stand (1 or More Minutes)-5  -LWA      Row Name 05/24/25 0011          How much help from another person do you currently need...    Turning from your back to your side while in flat bed without using bedrails? 3  -AG     Moving from lying on back to sitting on the side of a flat bed without bedrails? 3  -AG     Moving to and from a bed to a chair (including a wheelchair)? 3  -AG     Standing up from a chair using your arms (e.g., wheelchair, bedside chair)? 3  -AG     Climbing 3-5 steps with a railing? 2  -AG     To walk in hospital room? 2  -AG     AM-PAC 6 Clicks Score (PT) 16  -AG     Highest Level of Mobility Goal Stand (1 or More Minutes)-5  -AG       Row Name 05/24/25 1046          Functional Assessment    Outcome Measure Options AM-PAC 6 Clicks Basic Mobility (PT)  -LW               User Key  (r) = Recorded By, (t) = Taken By, (c) = Cosigned By      Initials Name Provider Type    AG Judy Padron, RN Registered Nurse    Rox Coon, MIQUEL Physical Therapist    Oswald Rothman RN Registered Nurse                                 Physical Therapy Education       Title: PT OT SLP Therapies (In Progress)       Topic: Physical Therapy (In Progress)       Point: Mobility training (Done)       Learning Progress Summary            Patient Acceptance, E, VU by JOYCE at 5/24/2025 1046                      Point: Home exercise program (Not Started)       Learner Progress:  Not documented in this visit.              Point: Body mechanics (Done)       Learning Progress Summary            Patient Acceptance, E, VU by JOYCE at 5/24/2025 1046                      Point: Precautions  (Done)       Learning Progress Summary            Patient Acceptance, E, VU by JOYCE at 5/24/2025 1046                                      User Key       Initials Effective Dates Name Provider Type Discipline     05/08/23 -  Rox Woodson, MIQUEL Physical Therapist PT                  PT Recommendation and Plan  Planned Therapy Interventions (PT): balance training, bed mobility training, gait training, home exercise program, stretching, strengthening, stair training, ROM (range of motion), patient/family education, neuromuscular re-education, transfer training  Progress: no change  Outcome Evaluation: Pt is a 75 y/o female admitted to [facility] for R knee pain. Prior to admission she was indep with all mobility and lives with her daughter wit 4 TIFFANIE. Today she presented with mobility below baseline, decreased tolerance to activity, and increased R knee pain. She completed bed mobiltiy SBA, stood with Oz and rwx, and ambulated 15' to the BR with CGA and rwx. She was unable to bear weight through her RLE this date. Patient will benefit from continued skilled PT addressing limitations in functional mobility to maximize safety and independence. Therapy recommendations include OPPT if needed at d/c.     Time Calculation:         PT Charges       Row Name 05/24/25 1047             Time Calculation    Start Time 0922  -LW      Stop Time 0936  -LW      Time Calculation (min) 14 min  -LW      PT Received On 05/24/25  -LW      PT - Next Appointment 05/26/25  -LW      PT Goal Re-Cert Due Date 06/03/25  -LW         Time Calculation- PT    Total Timed Code Minutes- PT 10 minute(s)  -LW         Timed Charges    97730 - PT Therapeutic Activity Minutes 10  -LW         Total Minutes    Timed Charges Total Minutes 10  -LW       Total Minutes 10  -LW                User Key  (r) = Recorded By, (t) = Taken By, (c) = Cosigned By      Initials Name Provider Type    Rox Coon, MIQUEL Physical Therapist                  Therapy Charges for Today        Code Description Service Date Service Provider Modifiers Qty    17692209574  PT THERAPEUTIC ACT EA 15 MIN 5/24/2025 Rox Woodson, PT GP 1    [id number] HC PT EVAL MOD COMPLEXITY 2 5/24/2025 Rox Woodson, PT GP 1            PT G-Codes  Outcome Measure Options: AM-PAC 6 Clicks Basic Mobility (PT)  AM-PAC 6 Clicks Score (PT): 19  PT Discharge Summary  Anticipated Discharge Disposition (PT): home with assist, home with outpatient therapy services    Rox Woodson, PT  5/24/2025

## 2025-05-24 NOTE — ED NOTES
"Nursing report ED to floor  Debbie Carrasquillo  74 y.o.  female    HPI :  HPI  Stated Reason for Visit: right leg pain, went to pmd who ruled out a blood clot - states turned and had excruciating pain behind the right knee  History Obtained From: patient    Chief Complaint  Chief Complaint   Patient presents with    Knee Pain       Admitting doctor:   Ze Mcmillan II, MD    Admitting diagnosis:   The primary encounter diagnosis was Acute pain of right knee. A diagnosis of Unable to bear weight on right lower extremity was also pertinent to this visit.    Code status:   Current Code Status       Date Active Code Status Order ID Comments User Context       Prior            Allergies:   Cyclobenzaprine, Glimepiride, Metformin, Novocain [procaine], Other, and Sulfamethoxazole-trimethoprim    Isolation:   No active isolations    Intake and Output  No intake or output data in the 24 hours ending 05/23/25 2228    Weight:       05/23/25 1917   Weight: 77.6 kg (171 lb)       Most recent vitals:   Vitals:    05/23/25 1917 05/23/25 2038 05/23/25 2200   BP: 160/86 162/77 141/70   BP Location: Right arm     Patient Position: Sitting     Pulse: 72 74 88   Resp: 18     Temp: 97.8 °F (36.6 °C)     TempSrc: Oral     SpO2: 100% 99% 93%   Weight: 77.6 kg (171 lb)     Height: 157.5 cm (62\")         Active LDAs/IV Access:   Lines, Drains & Airways       Active LDAs       Name Placement date Placement time Site Days    Peripheral IV 05/23/25 2122 20 G Left Antecubital 05/23/25 2122  Antecubital  less than 1                    Labs (abnormal labs have a star):   Labs Reviewed   COMPREHENSIVE METABOLIC PANEL - Abnormal; Notable for the following components:       Result Value    Glucose 112 (*)     Creatinine 1.05 (*)     eGFR 56.2 (*)     All other components within normal limits    Narrative:     GFR Categories in Chronic Kidney Disease (CKD)              GFR Category          GFR (mL/min/1.73)    Interpretation  G1                 "    90 or greater        Normal or high (1)  G2                    60-89                Mild decrease (1)  G3a                   45-59                Mild to moderate decrease  G3b                   30-44                Moderate to severe decrease  G4                    15-29                Severe decrease  G5                    14 or less           Kidney failure    (1)In the absence of evidence of kidney disease, neither GFR category G1 or G2 fulfill the criteria for CKD.    eGFR calculation 2021 CKD-EPI creatinine equation, which does not include race as a factor   CBC WITH AUTO DIFFERENTIAL - Abnormal; Notable for the following components:    Lymphocyte % 18.8 (*)     All other components within normal limits   CBC AND DIFFERENTIAL    Narrative:     The following orders were created for panel order CBC & Differential.  Procedure                               Abnormality         Status                     ---------                               -----------         ------                     CBC Auto Differential[[id number]]        Abnormal            Final result                 Please view results for these tests on the individual orders.       EKG:   No orders to display       Meds given in ED:   Medications   sodium chloride 0.9 % flush 10 mL (has no administration in time range)   morphine injection 4 mg (4 mg Intravenous Given 5/23/25 2125)   ondansetron (ZOFRAN) injection 4 mg (4 mg Intravenous Given 5/23/25 2125)       Imaging results:  XR Knee 1 or 2 View Right  Result Date: 5/23/2025  1. Degenerative changes of the right knee. 2. No acute bony abnormality is seen.         Ambulatory status:   - bed rest      Social issues:   Social History     Socioeconomic History    Marital status:    Tobacco Use    Smoking status: Former   Vaping Use    Vaping status: Never Used   Substance and Sexual Activity    Alcohol use: Yes     Alcohol/week: 1.0 standard drink of alcohol     Types: 1 Glasses of wine per  week     Comment: OCC    Drug use: No    Sexual activity: Defer     Birth control/protection: Post-menopausal       Peripheral Neurovascular  Peripheral Neurovascular (Adult)  Peripheral Neurovascular WDL: WDL    Neuro Cognitive  Neuro Cognitive (Adult)  Cognitive/Neuro/Behavioral WDL: WDL  Sedation Group  POSS ([place] Opioid-Induced Sed Scale): 1 - Awake and alert    Learning  Learning Assessment  Learning Readiness and Ability: no barriers identified  Education Provided  Person Taught: patient  Teaching Method: verbal instruction  Teaching Focus: symptom/problem overview  Education Outcome Evaluation: eager to learn    Respiratory  Respiratory WDL  Respiratory WDL: WDL    Abdominal Pain       Pain Assessments  Pain (Adult)  (0-10) Pain Rating: Rest: 8  (0-10) Pain Rating: Activity: 8  Pain Location: knee  Pain Side/Orientation: right  Pain Management Interventions: pain medication given    NIH Stroke Scale       Quin Livingston RN  05/23/25 22:28 EDT

## 2025-05-24 NOTE — CASE MANAGEMENT/SOCIAL WORK
Discharge Planning Assessment  [facility]     Patient Name: Debbie Carrasquillo  MRN: [id number]  Today's Date: 5/24/2025    Admit Date: 5/23/2025    Plan: plans to return to her home, daughter to provide transportation,DME walker provided to pt for ambulation/mobility.   Discharge Needs Assessment       Row Name 05/24/25 1150       Living Environment    People in Home child(ulysses), adult    Name(s) of People in Home Shirin Carrasquillo    Current Living Arrangements home    Potentially Unsafe Housing Conditions none    In the past 12 months has the electric, gas, oil, or water company threatened to shut off services in your home? No    Primary Care Provided by self    Provides Primary Care For no one, unable/limited ability to care for self    Caregiving Concerns Ambulation status. ability to get around the house  daughter will assist with personal care as needed to provide a safe home environment.    Family Caregiver if Needed child(ulysses), adult    Quality of Family Relationships helpful;involved;supportive    Able to Return to Prior Arrangements yes    Living Arrangement Comments LIves in a single family home with her daughter. 4 steps to enter, all living spaces are on one floor level.       Resource/Environmental Concerns    Resource/Environmental Concerns none    Transportation Concerns none       Transportation Needs    In the past 12 months, has lack of transportation kept you from medical appointments or from getting medications? no    In the past 12 months, has lack of transportation kept you from meetings, work, or from getting things needed for daily living? No       Food Insecurity    Within the past 12 months, you worried that your food would run out before you got the money to buy more. Never true    Within the past 12 months, the food you bought just didn't last and you didn't have money to get more. Never true       Transition Planning    Patient/Family Anticipates Transition to home with family     Patient/Family Anticipated Services at Transition outpatient care  possible outpatient physical therapy    Transportation Anticipated family or friend will provide       Discharge Needs Assessment    Readmission Within the Last 30 Days no previous admission in last 30 days    Equipment Currently Used at Home cane, straight;glucometer;bp cuff  states seh needs a walker    Concerns to be Addressed care coordination/care conferences    Do you want help finding or keeping work or a job? I do not need or want help    Do you want help with school or training? For example, starting or completing job training or getting a high school diploma, GED or equivalent No    Anticipated Changes Related to Illness inability to care for self    Equipment Needed After Discharge walker, rolling    Outpatient/Agency/Support Group Needs outpatient therapy    Provided Post Acute Provider List? Yes    Post Acute Provider List Outpatient Therapy  outpatient physical therapy    Delivered To Patient    Method of Delivery In person                   Discharge Plan       Row Name 05/24/25 0168       Plan    Plan plans to return to her home, daughter to provide transportation,DME walker provided to pt for ambulation/mobility.    Patient/Family in Agreement with Plan yes    Provided Post Acute Provider List? Yes    Post Acute Provider List Outpatient Therapy    Delivered To Patient    Method of Delivery In person    Plan Comments Entered room, identified self and role of CCP nurse/discharge planner. Verified demographics.  Pt addrress and phone number are correct. Verified pharmacy is [facility] on [facility]. Verified patient doctor is Alena March MD.  Pt admitted for right knee pain. Pain continues. She has been provided a knee immobolizer for stability.  Pt has been provided with a rolling walker for mobility assist.  Pt is diabetic and has Glucometer at home. She has been fully independent until this episode.  She still drives, denies any  financial concerns, no 30 day readmit. She lives with her daughter Darlene in a single family home with 4 steps to enter, all living space is one one floor.  The patient feels safe returning to her home with help from her daughter Darlene. She states she would rather have outpt PT or home health  than go to a rehab facility. She states that she feels safe going home with her daughter to help. No futher needs identified at this time. CCP to follow for any new needs identified.  Isaias diaz ccp                  Continued Care and Services - Admitted Since 5/23/2025       Durable Medical Equipment       Service Provider Request Status Services Address Phone Fax Patient Preferred    [facility] Pending - Request Sent -- [address] [place] [address] [phone] --       Internal Comment last updated by Nargis Nunes RN 5/24/2025 1122    Placed call to Suzanne with [facility] [phone].                                Demographic Summary       Row Name 05/24/25 1143       General Information    Admission Type observation    Arrived From emergency department    Required Notices Provided Observation Status Notice    Referral Source emergency department    Reason for Consult decision-making;discharge planning    Preferred Language English    General Information Comments Patient resides at [address] [place] [address]. PHone number is [phone]       Contact Information    Permission Granted to Share Info With family/designee    Contact Information Comments Emergency contact is Darlene Carrasquillo , daughter [phone]                   Functional Status       Row Name 05/24/25 1145       Functional Status    Usual Activity Tolerance good    Current Activity Tolerance fair    Functional Status Comments Pt is ambulating below her baseline, due to right knee pain.  She is able to ambulate with a walker, as noted by physical therapy. Patient lives with her daughter.       Physical Activity     On average, how many days per week do you engage in moderate to strenuous exercise (like a brisk walk)? 4 days    On average, how many minutes do you engage in exercise at this level? 30 min    Number of minutes of exercise per week 120       Assessment of Health Literacy    How often do you have someone help you read hospital materials? Never    How often do you have problems learning about your medical condition because of difficulty understanding written information? Never    How often do you have a problem understanding what is told to you about your medical condition? Never    How confident are you filling out medical forms by yourself? Quite a bit    Health Literacy Good       Functional Status, IADL    Medications independent    Meal Preparation independent    Housekeeping assistive person  daughter will help due to knee pain    Laundry assistive person    Shopping assistive person    If for any reason you need help with day-to-day activities such as bathing, preparing meals, shopping, managing finances, etc., do you get the help you need? I get all the help I need    IADL Comments Prior to this injury/pain she is fully independent with all ADLs, now she needs help with housekeeping, laundry and shopping.  Daughter to provide assistance.       Mental Status    General Appearance WDL WDL       Mental Status Summary    Mental Status Comments AAOx4       Employment/    Employment Status retired                   Psychosocial    No documentation.                  Abuse/Neglect    No documentation.                  Legal    No documentation.                  Substance Abuse       Row Name 05/24/25 1150       Substance Use    Substance Use Status never used       AUDIT-C (Alcohol Use Disorders Identification Test)    Q1: How often do you have a drink containing alcohol? Never    Q2: How many drinks containing alcohol do you have on a typical day when you are drinking? None    Q3: How often do you have  six or more drinks on one occasion? Never    Audit-C Score 0                   Patient Forms    No documentation.                     Nargis Nunes RN

## 2025-05-24 NOTE — PROGRESS NOTES
MD Attestation Note    SHARED VISIT: This visit was performed by BOTH a physician and an APC. The substantive portion of the medical decision making was performed by this attesting physician who made or approved the management plan and takes responsibility for patient management. All studies in the APC note (if performed) were independently interpreted by me.       My personal findings are:        Subjective:  Patient presents with acute right knee pain.  She has had some pain for the last couple weeks but yesterday in her kitchen she had sudden severe pain.  The pain is on the outer aspect of the knee and behind her knee.  She states that she cannot bend it or bear weight secondary to pain.  She denies fever or chills.        Objective:  GENERAL: Awake, alert, in no acute distress.   HENT:  Normocephalic, atraumatic. Nares patent.   EYES: Pupils equal, reactive. Extraocular movements normal.   NEUROLOGIC: Cranial nerves II-XII normal. Motor strength 5/5 in extremities.   EXTREMITIES: No pedal edema. 2+ pedal pulses bilaterally. No deformity.  There is no appreciable right knee effusion.  She has point tenderness over the lateral joint line and also in the popliteal fossa without appreciable mass.  Lachman's is stable.  No patellar crepitus.  No erythema or warmth overlying the right knee.  SKIN:  No rash, normal to inspection.      XR Knee 1 or 2 View Right  Result Date: 5/23/2025  XR KNEE 1 OR 2 VW RIGHT-5/23/2025  HISTORY: Right knee pain.  There is mild tibiofemoral joint space narrowing most pronounced medially. Hypertrophic changes are seen in the patella along the nonarticular anterior and superior aspects. There is some sclerosis in the distal shaft of the femur likely healed infarct or enchondroma and likely benign. There is some popliteal artery calcification.  No fractures are seen.      1. Degenerative changes of the right knee. 2. No acute bony abnormality is seen.           Assessment/ Plan:  Acute  right knee pain  Plain films did not show any acute abnormality.  MRI right knee pending final read by radiology however patient was evaluated by Dr. Chamorro of orthopedics who suspect she may have a lateral meniscus injury but no indication for her to be nonweightbearing, no indication for emergent surgery.  He recommended a knee immobilizer, pain control, follow-up in the office for consideration of intra-articular steroid injection.  PT eval  Multimodal pain control        ASTRID query complete. Treatment plan to include limited course of prescribed  controlled substance. Risks including addiction, benefits, and alternatives presented to patient.

## 2025-05-24 NOTE — DISCHARGE PLACEMENT REQUEST
"Debbie Carrasquillo (74 y.o. Female)       Date of Birth   1950    Social Security Number       Address   [address] [place] [address]    Home Phone   [phone]    MRN   [id number]       Religious   Moravian    Marital Status                               Admission Date   5/23/2025    Admission Type   Emergency    Admitting Provider   Ze Mcmillan II, MD    Attending Provider   Zackery Lopez MD    Department, Room/Bed   [facility] OBSERVATION, 126/1       Discharge Date       Discharge Disposition       Discharge Destination                                 Attending Provider: Zackery Lopez MD    Allergies: Cyclobenzaprine, Metformin, Novocain [Procaine], Sulfamethoxazole-trimethoprim, Glimepiride, Other    Isolation: None   Infection: None   Code Status: CPR    Ht: 157.5 cm (62\")   Wt: 77.6 kg (171 lb)    Admission Cmt: None   Principal Problem: Knee pain, acute [M25.569]                   Active Insurance as of 5/23/2025       Primary Coverage       Payor Plan Insurance Group Employer/Plan Group    ANTH MEDICARE REPLACEMENT [facility] MEDICARE ADVANTAGE HMO [id number]       Payor Plan Address Payor Plan Phone Number Payor Plan Fax Number Effective Dates    [address] [phone]  1/1/2025 - None Entered    [facility] [address]         Subscriber Name Subscriber Birth Date Member ID       DEBBIE CARRASQUILLO 1950 [id number]                     Emergency Contacts        (Rel.) Home Phone Work Phone Mobile Phone    Darlene Carrasquillo (Daughter) [phone] -- [phone]                "

## 2025-05-24 NOTE — PROGRESS NOTES
ED OBSERVATION PROGRESS/DISCHARGE SUMMARY    Date of Admission: 5/23/2025   LOS: 0 days   PCP: Alena Paz MD    Working diagnosis: Right meniscal tear, trouble bearing weight      Subjective     Hospital Outcome: Pending at this time, likely discharge tomorrow morning when logistics align    Debbie Carrasquillo is a 74 y.o. female who was admitted to the ED observation unit for further evaluation of right knee pain.  Patient reports she was standing at her sink tonight turned to walk away and felt a sharp sudden onset of severe pain in the posterior lateral aspect of the right knee and has been unable to bend her knee or bear weight since time of injury.  Patient had an ultrasound yesterday and primary care showing no DVT.  In the ED patient had plain films of the right knee that showed degenerative changes but no acute bony abnormality.  MRI of the right knee without contrast is pending.    10:30 AM.  Orthopedics has evaluated the patient.  They viewed the MRI and there appears to be very minimal effusion and likely a nondisplaced lateral meniscal tear.  No evidence of microtrabecular stress fracture.  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  Ortho recommends pain control, knee immobilizer, walker.  It is felt she may be a candidate for intra-articular corticosteroid injection that can be arranged outpatient if she fails to improve.    2:28 PM.  MRI results are still pending.  MRI completed at 4:40 AM.  Order was placed stat.  I placed a call to film library to investigate and ensure this is on someone's list to be read.    I also reevaluated the patient.  She is able to walk with a walker and knee immobilizer.  Physical therapy wants her to have home PT.  [facility] is cleared the patient for discharge reports her daughter will be available to take care of her.  Due to logistics patient would like to remain overnight where transport home can be better arranged tomorrow.  Patient has only  been in the ED observation unit for 17 hours.  This seems a reasonable request and we will count on discharge tomorrow.        ROS:  General: no fevers, chills  Respiratory: no cough, dyspnea  Cardiovascular: no chest pain, palpitations  Abdomen: No abdominal pain, nausea, vomiting, or diarrhea  Neurologic: No focal weakness    Objective   Physical Exam:  I have reviewed the vital signs.  Temp:  [97.4 °F (36.3 °C)-97.8 °F (36.6 °C)] 97.5 °F (36.4 °C)  Heart Rate:  [72-88] 73  Resp:  [16-18] 18  BP: (109-162)/(53-86) 109/53  General Appearance:    Alert, cooperative, no distress  Head:    Normocephalic, atraumatic  Eyes:    Sclerae anicteric  Neck:   Supple, no mass  Lungs: Clear to auscultation bilaterally, respirations unlabored  Heart: Regular rate and rhythm, S1 and S2 normal, no murmur, rub or gallop  Abdomen:  Soft, nontender, bowel sounds active, nondistended  Extremities: No clubbing, cyanosis, or edema to lower extremities  Pulses:  2+ and symmetric in distal lower extremities  Skin: No rashes   Neurologic: Oriented x3, Normal strength to extremities    Results Review:    I have reviewed the labs, radiology results and diagnostic studies.    Results from last 7 days   Lab Units 05/23/25  2121   WBC 10*3/mm3 7.18   HEMOGLOBIN g/dL 12.4   HEMATOCRIT % 37.2   PLATELETS 10*3/mm3 175     Results from last 7 days   Lab Units 05/23/25  2121   SODIUM mmol/L 136   POTASSIUM mmol/L 4.5   CHLORIDE mmol/L 102   CO2 mmol/L 24.4   BUN mg/dL 20   CREATININE mg/dL 1.05*   CALCIUM mg/dL 9.6   BILIRUBIN mg/dL 0.3   ALK PHOS U/L 72   ALT (SGPT) U/L 26   AST (SGOT) U/L 30   GLUCOSE mg/dL 112*     Imaging Results (Last 24 Hours)       Procedure Component Value Units Date/Time    MRI Knee Right Without Contrast [[id number]] Resulted: 05/24/25 1436     Updated: 05/24/25 0440    XR Knee 1 or 2 View Right [[id number]] Collected: 05/23/25 2059     Updated: 05/23/25 2059    Narrative:      XR KNEE 1 OR 2 VW RIGHT-5/23/2025     HISTORY:  Right knee pain.     There is mild tibiofemoral joint space narrowing most pronounced  medially. Hypertrophic changes are seen in the patella along the  nonarticular anterior and superior aspects. There is some sclerosis in  the distal shaft of the femur likely healed infarct or enchondroma and  likely benign. There is some popliteal artery calcification.     No fractures are seen.       Impression:      1. Degenerative changes of the right knee.  2. No acute bony abnormality is seen.                  I have reviewed the medications.     Discharge Medications        New Medications        Instructions Start Date   traMADol 50 MG tablet  Commonly known as: ULTRAM   50 mg, Oral, Every 12 Hours PRN             Continue These Medications        Instructions Start Date   aspirin 325 MG EC tablet   325 mg, Daily      atorvastatin 40 MG tablet  Commonly known as: LIPITOR   40 mg, Oral, Daily      isosorbide mononitrate 30 MG 24 hr tablet  Commonly known as: IMDUR   15 mg, Oral, Daily      nitroglycerin 0.4 MG SL tablet  Commonly known as: NITROSTAT   0.4 mg, Sublingual, Every 5 Minutes PRN, Take no more than 3 doses in 15 minutes.      pioglitazone 30 MG tablet  Commonly known as: ACTOS   30 mg, Daily      Trulicity 0.75 MG/0.5ML solution pen-injector  Generic drug: Dulaglutide   sundays             ASK your doctor about these medications        Instructions Start Date   levothyroxine 50 MCG tablet  Commonly known as: SYNTHROID, LEVOTHROID   50 mcg, Daily              ---------------------------------------------------------------------------------------------  Assessment & Plan   Assessment/Problem List    Knee pain, acute      Plan:    Right knee pain  MRI of the right knee is pending  PT is cleared for discharge  CCP is cleared for discharge  Ortho recommends knee immobilizer, walker, weightbearing as tolerated  If failure to improve follow-up outpatient for corticosteroid injection  Due to logistics patient will remain  with this overnight and likely DC home tomorrow    Diabetes  Accu-Chek ACHS  Hemoglobin A1c 7.40  Continue Actos.  Diabetic diet     Hypertension  Continue Imdur     Hypothyroidism  Continue levothyroxine     VTE Prophylaxis:  No VTE prophylaxis order currently exists.    Disposition: Pending    Follow-up after Discharge: Pending    This note will serve as a progress note    Timmy Friedman III, PA 05/24/25 14:38 EDT    I have worn appropriate PPE during this patient encounter, sanitized my hands both with entering and exiting patient's room.      54 minutes has been spent by [facility] providers in the care of this patient while under observation status on this date 05/24/25

## 2025-05-24 NOTE — PLAN OF CARE
Goal Outcome Evaluation:      Pt is awake, alert, and oriented x 4. Pt is sitting up in chair at bedside. Awaiting MRI results. VSS. Bed is in the lowest position. Call light is within reach.

## 2025-05-24 NOTE — PLAN OF CARE
Goal Outcome Evaluation:    Pt admitted for further evaluation of right knee pain for 2 weeks.  MRI completed  PT and Ortho Surgery consulted

## 2025-05-24 NOTE — H&P
[facility]   HISTORY AND PHYSICAL    Patient Name: Debbie Carrasquillo  : 1950  MRN: [id number]  Primary Care Physician:  Alena Paz MD  Date of admission: 2025    Subjective   Subjective     Chief Complaint:   Chief Complaint   Patient presents with    Knee Pain         HPI:    Debbie Carrasquillo is a 74 y.o. female with a past medical history significant for CAD with stent placement, diabetes, hyperlipidemia, and hypertension who presented to the emergency department with a complaint of right knee pain and was admitted to the ED observation unit for further evaluation and workup.    Patient states she was standing at her sink tonight when she turned to walk away and felt a sharp, sudden onset of severe pain to the posterior lateral aspect of her right knee and has been unable to bend her knee or bear weight. Patient reports ongoing pain to the right knee, mostly to the posterior aspect, for the past 2 to 3 weeks. Denies any fall or injury. She was seen by primary care yesterday and had ultrasound showing no DVT.     ED work-up: CBC unremarkable.  CMP normal except creatinine 1.05 and GFR 56.2, and glucose 112.  X-ray of the right knee reported degenerative changes of the right knee.    On admission to the ED Observation Unit, patient states she is unable to bend her knee or bear weight without excruciating pain behind her right knee. Pain was improved but not relieved with analgesic medication given in the ED. Vital signs stable.     Review of Systems   All systems were reviewed and negative except for: As documented in HPI.    Personal History     Past Medical History:   Diagnosis Date    Cardiac abnormality     stent placment    Diabetes mellitus     Hyperlipidemia     Hypertension        Past Surgical History:   Procedure Laterality Date    CARDIAC CATHETERIZATION N/A 2018    Procedure: Left Heart Cath;  Surgeon: Victorina Scott MD;  Location: [facility] CATH INVASIVE LOCATION;   "Service: Cardiology    CARDIAC CATHETERIZATION N/A 9/7/2018    Procedure: Coronary angiography;  Surgeon: Victorina Scott MD;  Location:  [facility] CATH INVASIVE LOCATION;  Service: Cardiology    CARDIAC CATHETERIZATION N/A 9/7/2018    Procedure: Left ventriculography;  Surgeon: Victorina Scott MD;  Location:  [facility] CATH INVASIVE LOCATION;  Service: Cardiology       Family History: family history includes Heart disease in her brother, mother, and sister. Otherwise pertinent FHx was reviewed and not pertinent to current issue.    Social History:  reports that she has quit smoking. She does not have any smokeless tobacco history on file. She reports current alcohol use of about 1.0 standard drink of alcohol per week. She reports that she does not use drugs.    Home Medications:  Dulaglutide, Turmeric, aspirin, atorvastatin, furosemide, isosorbide mononitrate, nitrofurantoin (macrocrystal-monohydrate), nitroglycerin, and pioglitazone    Allergies:  Allergies   Allergen Reactions    Cyclobenzaprine Nausea And Vomiting     MUSCLE RELAXERS    Glimepiride Diarrhea     diarrhea    Metformin Diarrhea    Novocain [Procaine] Other (See Comments)     Shaking      Other Provider Review Needed     \"muslce relaxers but I don't know which one.\"    Sulfamethoxazole-Trimethoprim Itching     Mild rash and facial flushing and redness       Objective   Objective     Vitals:   Temp:  [97.8 °F (36.6 °C)] 97.8 °F (36.6 °C)  Heart Rate:  [72-88] 88  Resp:  [18] 18  BP: (141-162)/(70-86) 141/70  Physical Exam    Constitutional: Awake, alert   Eyes: PERRLA, sclerae anicteric, no conjunctival injection   HENT: NCAT, mucous membranes moist   Neck: Supple, no thyromegaly, no lymphadenopathy, trachea midline   Respiratory: Clear to auscultation bilaterally, nonlabored respirations   Cardiovascular: RRR, no murmurs, rubs, or gallops, palpable pedal pulses bilaterally   Musculoskeletal: No bilateral ankle edema, no clubbing or cyanosis to " extremities   Psychiatric: Appropriate affect, cooperative  Neurologic: Oriented x 3, strength symmetric in all extremities, Cranial Nerves grossly intact to confrontation, speech clear   Skin: No rashes     Result Review    Result Review:  I have personally reviewed the results from the time of this admission to 5/23/2025 22:29 EDT and agree with these findings:  [x]  Laboratory list / accordion  []  Microbiology  [x]  Radiology  []  EKG/Telemetry   []  Cardiology/Vascular   []  Pathology  [x]  Old records  []  Other:  Most notable findings include: creatinine 1.05       Assessment & Plan   The ASCVD Risk score (Massiel COLEY, et al., 2019) failed to calculate for the following reasons:    Cannot find a previous HDL lab    Cannot find a previous total cholesterol lab    Assessment / Plan     Brief Patient Summary:  Debbie Carrasquillo is a 74 y.o. female who was admitted to the ED observation unit for further evaluation and workup with a complaint of right knee pain predominantly to the posterior lateral aspect. Patient has had ongoing pain for the past 2-3 weeks, but the pain was exacerbated tonight when she turned to step away from her sink. Outpatient Venous Doppler was negative for a DVT yesterday. MRI of right knee is pending.    Active Hospital Problems:  Active Hospital Problems    Diagnosis     **Knee pain, acute      Plan:     Right knee pain  MRI of the right knee is pending  Analgesic medication for pain control  Consider orthopedic consultation  PT evaluation pending  Hold aspirin    Diabetes  Accu-Chek ACHS  Hemoglobin A1c 7.40  Continue Actos.    Hypertension  Continue Imdur    Hypothyroidism  Continue levothyroxine    VTE Prophylaxis:  No VTE prophylaxis order currently exists.      CODE STATUS:       Admission Status:  I believe this patient meets observation status.    Electronically signed by TREVOR Barbosa, 05/23/25, 10:29 PM EDT.        75 minutes has been spent by [facility] providers in the care of this patient while under observation status on this date 05/23/25      I have worn appropriate PPE during this patient encounter, sanitized my hands both with entering and exiting patient's room.    I have discussed plan of care with patient including advance care plan and/or surrogate decision maker.  Patient advises that their daughter will be their primary surrogate decision maker

## 2025-05-24 NOTE — PLAN OF CARE
Goal Outcome Evaluation:  Plan of Care Reviewed With: patient        Progress: no change  Outcome Evaluation: Pt is a 75 y/o female admitted to [facility] for R knee pain. Prior to admission she was indep with all mobility and lives with her daughter wit 4 TIFFANIE. Today she presented with mobility below baseline, decreased tolerance to activity, and increased R knee pain. She completed bed mobiltiy SBA, stood with Oz and rwx, and ambulated 15' to the BR with CGA and rwx. She was unable to bear weight through her RLE this date. Patient will benefit from continued skilled PT addressing limitations in functional mobility to maximize safety and independence. Therapy recommendations include OPPT if needed at d/c.    Anticipated Discharge Disposition (PT): home with assist, home with outpatient therapy services

## 2025-05-24 NOTE — CONSULTS
"  Orthopaedic Consult    Everton Chamorro MD      Patient: Debbie Carrasquillo    Date of Admission: 5/23/2025  8:33 PM    YOB: 1950    Medical Record Number: [id number]    Attending Physician: Zackery Lopez MD  Consulting Physician: Everton Chamorro MD      Chief Complaints: Knee pain, acute [M25.569]  Acute pain of right knee [M25.561]  Unable to bear weight on right lower extremity [R26.89]      History of Present Illness: 74 y.o. female admitted to [facility] with Knee pain, acute [M25.569]  Acute pain of right knee [M25.561]  Unable to bear weight on right lower extremity [R26.89].   The patient has been complaining of intermittent pain in the right knee for a little while.  Her primary care physician has worked it up.  She had a duplex ultrasound performed recently which was negative.  Patient states yesterday she was standing when she felt a sudden and sharp pain at the posterior lateral aspect of the knee.  She states that she nearly fell.  She had difficulty bearing weight.  The pain was so severe that she presented to the emergency department.  Radiographs were performed once again which confirmed no evidence of fracture.  She also had an MRI performed.  Patient states that the pain is a little better than at the time of presentation but she still has pain when bending her knee.  She denies any audible or palpable clicks or catches in the knee.    Allergies:   Allergies   Allergen Reactions    Cyclobenzaprine Nausea And Vomiting     MUSCLE RELAXERS    Metformin Diarrhea    Novocain [Procaine] Other (See Comments)     Shaking      Sulfamethoxazole-Trimethoprim Itching     Mild rash and facial flushing and redness    Glimepiride Diarrhea     diarrhea    Other Provider Review Needed     \"muslce relaxers but I don't know which one.\"       Medications:   Home Medications:  Medications Prior to Admission   Medication Sig Dispense Refill Last Dose/Taking    aspirin  MG tablet Take 1 " tablet by mouth Daily.   5/23/2025 Morning    isosorbide mononitrate (IMDUR) 30 MG 24 hr tablet Take 0.5 tablets by mouth Daily. 15 tablet 6 5/23/2025 Morning    levothyroxine (SYNTHROID, LEVOTHROID) 50 MCG tablet Take 1 tablet by mouth Daily.   5/23/2025 Morning    pioglitazone (ACTOS) 30 MG tablet Take 1 tablet by mouth Daily.   5/23/2025 Morning    atorvastatin (LIPITOR) 40 MG tablet Take 1 tablet by mouth Daily.       nitroglycerin (NITROSTAT) 0.4 MG SL tablet Place 1 tablet under the tongue Every 5 (Five) Minutes As Needed for Chest Pain. Take no more than 3 doses in 15 minutes. 25 tablet 12 Unknown    Trulicity 0.75 MG/0.5ML solution pen-injector sundays          Current Medications:  Scheduled Meds:isosorbide mononitrate, 15 mg, Oral, Daily  ketorolac, 15 mg, Intravenous, Once  levothyroxine, 50 mcg, Oral, Q AM  pioglitazone, 30 mg, Oral, Daily  sodium chloride, 10 mL, Intravenous, Q12H      Continuous Infusions:   PRN Meds:.  HYDROcodone-acetaminophen    ondansetron ODT **OR** ondansetron    [COMPLETED] Insert Peripheral IV **AND** sodium chloride    sodium chloride    sodium chloride    Past Medical History:   Diagnosis Date    Cardiac abnormality     stent placment    Diabetes mellitus     Hyperlipidemia     Hypertension      Past Surgical History:   Procedure Laterality Date    CARDIAC CATHETERIZATION N/A 9/7/2018    Procedure: Left Heart Cath;  Surgeon: Victorina Scott MD;  Location: [facility] CATH INVASIVE LOCATION;  Service: Cardiology    CARDIAC CATHETERIZATION N/A 9/7/2018    Procedure: Coronary angiography;  Surgeon: Victorina Scott MD;  Location: [facility] CATH INVASIVE LOCATION;  Service: Cardiology    CARDIAC CATHETERIZATION N/A 9/7/2018    Procedure: Left ventriculography;  Surgeon: Victorina Scott MD;  Location: [facility] CATH INVASIVE LOCATION;  Service: Cardiology     Social History     Occupational History    Not on file   Tobacco Use    Smoking status: Former    Smokeless  tobacco: Not on file   Vaping Use    Vaping status: Never Used   Substance and Sexual Activity    Alcohol use: Yes     Alcohol/week: 1.0 standard drink of alcohol     Types: 1 Glasses of wine per week     Comment: OCC    Drug use: No    Sexual activity: Defer     Birth control/protection: Post-menopausal      Social History     Social History Narrative    Not on file     Family History   Problem Relation Age of Onset    Heart disease Mother     Heart disease Sister     Heart disease Brother          Review of Systems:   Constitutional: Negative for fatigue, fever, or weight loss  HEENT: Patient denies any headaches, vision changes, sore throat.   Pulmonary: Patient denies any cough, congestion, SOA, or wheezing  Cardiovascular: Patient denies any chest pain, palpitations, weakness,  Gastrointestinal:  Patient denies nausea, vomiting, diarrhea, constipation  Genital/Urinary: Patient denies dysuria, urinary frequency, urgency, incontinence, retention  Musculoskeletal: Positive for right knee pain. Negative for muscle cramps  Neurological: Patient denies dizziness, paresthesia, loss of sensation, seizures, syncope  Endocrine system: Patient denies tremors, cold or heat intolerance  Psychological: Patient denies thoughts/plans or harming self or other; hallucinations,  insomnia  Skin: Patient denies any bruising, rashes, lesions, or ulcers   Hematopoietic: Patient denies history of MRSA or slow wound healing,  spontaneous or excessive bleeding    Physical Exam: 74 y.o. female  Vitals:    05/23/25 2200 05/23/25 2359 05/24/25 0312 05/24/25 0718   BP: 141/70 133/62 125/70 138/65   BP Location:  Right arm Right arm Right arm   Patient Position:  Lying Lying Lying   Pulse: 88 77 73 76   Resp:  16 16 18   Temp:  97.6 °F (36.4 °C) 97.5 °F (36.4 °C) 97.4 °F (36.3 °C)   TempSrc:  Oral Oral Oral   SpO2: 93% 98%     Weight:       Height:                               General Appearance:  Alert, cooperative, in no acute distress     Pulmonary: Respirations non labored  MSK: The patient has tenderness at the posterior lateral aspect of the knee just proximal to the fibular head.  No pain or instability with varus or valgus stress.  No obvious instability with anterior posterior drawer.  There is no effusion.  Mild patellofemoral crepitance.  She tolerates flexion about 60 degrees with some pain with further flexion.  No warmth erythema to the knee.  Neuro: 5/5 strength ankle plantarflexion dorsiflexion EHL FHL sensation is intact to light touch deep peroneal superficial peroneal saphenous sural and tibial nerves        Diagnostic Tests:  XR KNEE 1 OR 2 VW RIGHT-5/23/2025     HISTORY: Right knee pain.     There is mild tibiofemoral joint space narrowing most pronounced  medially. Hypertrophic changes are seen in the patella along the  nonarticular anterior and superior aspects. There is some sclerosis in  the distal shaft of the femur likely healed infarct or enchondroma and  likely benign. There is some popliteal artery calcification.     No fractures are seen.     IMPRESSION:  1. Degenerative changes of the right knee.  2. No acute bony abnormality is seen.    Patient also had an MRI of the right knee which has not been formally read by radiology however my independent review demonstrates very minimal effusion.  There is some increased signal at the posterior body of the lateral meniscus concerning for possible nondisplaced tear.  No evidence of microtrabecular stress fracture.  Mild chondromalacia seen throughout the knee most severe in the patellofemoral compartment        Assessment:    Knee pain, acute            Plan:    Reassurance was provided I do not think that the patient has any acute injury that would warrant surgical intervention.  My impression based upon her exam is that she may have a degenerative lateral meniscus tear.  I do not see any findings on either her radiographs or her MRI that would cause us to have to limit her  weightbearing.  We will try some Toradol.  Also recommend a knee immobilizer when weightbearing to see if this gives her more support.  She can follow-up with us in the office this coming week to reassess.  Advised she may be a candidate for an intra-articular corticosteroid injection as an outpatient if she fails to improve    Date: 5/24/2025  Everton Chamorro MD  Orthopaedic Surgeon    [facility]  [phone]  [email or web address]

## 2025-05-24 NOTE — ED PROVIDER NOTES
EMERGENCY DEPARTMENT ENCOUNTER      PCP: Alena Paz MD  Patient Care Team:  Alena Paz MD as PCP - General (Internal Medicine)   Independent Historians: Patient, daughter    HPI:  Chief Complaint: R knee pain   A complete HPI/ROS/PMH/PSH/SH/FH are unobtainable due to: None    Chronic or social conditions impacting patient care (social determinants of health): None    Context: Debbie Carrasquillo is a 74 y.o. female who presents to the ED c/o acute R knee pain.  She reports she has been having some right knee pain mostly to the posterior aspect over the past 2 weeks.  She was seen by primary care yesterday and had ultrasound showing no DVT.  Patient states today she was standing at her sink and when she turned to walk away felt a very sharp sudden onset of severe pain to the posterior lateral aspect of her right knee.  Since that time she has been unable to bend her knee or bear weight on the right leg.  She denies any previous surgical history to the right knee.  No fevers or chills.  No low back pain.  No fall or direct trauma to the right knee.    Review of prior external notes and/or external test results outside of this encounter: Ultrasound of the right lower extremity on 5/21/2025 showed no evidence for DVT or SVT.  CMP on 5/24/2024 showed creatinine of 1.03.  Glucose 168.      PAST MEDICAL HISTORY  Active Ambulatory Problems     Diagnosis Date Noted    Chest pain 09/05/2018    Abnormal electrocardiogram 05/01/2024    Shortness of breath 05/01/2024     Resolved Ambulatory Problems     Diagnosis Date Noted    No Resolved Ambulatory Problems     Past Medical History:   Diagnosis Date    Cardiac abnormality     Diabetes mellitus     Hyperlipidemia     Hypertension        The patient has started, but not completed, their COVID-19 vaccination series.    PAST SURGICAL HISTORY  Past Surgical History:   Procedure Laterality Date    CARDIAC CATHETERIZATION N/A 9/7/2018    Procedure: Left Heart Cath;   Surgeon: Victorina Scott MD;  Location:  [facility];  Service: Cardiology    CARDIAC CATHETERIZATION N/A 9/7/2018    Procedure: Coronary angiography;  Surgeon: Victorina Scott MD;  Location:  [facility];  Service: Cardiology    CARDIAC CATHETERIZATION N/A 9/7/2018    Procedure: Left ventriculography;  Surgeon: Victorina Scott MD;  Location:  [facility];  Service: Cardiology         FAMILY HISTORY  Family History   Problem Relation Age of Onset    Heart disease Mother     Heart disease Sister     Heart disease Brother          SOCIAL HISTORY  Social History     Socioeconomic History    Marital status:    Tobacco Use    Smoking status: Former   Vaping Use    Vaping status: Never Used   Substance and Sexual Activity    Alcohol use: Yes     Alcohol/week: 1.0 standard drink of alcohol     Types: 1 Glasses of wine per week     Comment: OCC    Drug use: No    Sexual activity: Defer     Birth control/protection: Post-menopausal         ALLERGIES  Cyclobenzaprine, Glimepiride, Metformin, Novocain [procaine], Other, and Sulfamethoxazole-trimethoprim        REVIEW OF SYSTEMS  Review of Systems   Musculoskeletal:  Positive for arthralgias (Right knee).        All systems reviewed and negative except for those discussed in HPI.       PHYSICAL EXAM    I have reviewed the triage vital signs and nursing notes.    ED Triage Vitals [05/23/25 1917]   Temp Heart Rate Resp BP SpO2   97.8 °F (36.6 °C) 72 18 160/86 100 %      Temp src Heart Rate Source Patient Position BP Location FiO2 (%)   Oral Monitor Sitting Right arm --       Physical Exam  GENERAL: alert, peers uncomfortable  SKIN: Warm, dry  HENT: Normocephalic, atraumatic  EYES: no scleral icterus  CV: regular rhythm, regular rate  RESPIRATORY: normal effort, lungs clear  ABDOMEN: soft, nontender, nondistended  MUSCULOSKELETAL: Significant tenderness to the posterior lateral aspect of the right  knee, increased pain with flexion at the knee, distal pulses 2+, no calf tenderness, no deformity, no tenderness to the patella  NEURO: alert, moves all extremities, follows commands          LAB RESULTS  Recent Results (from the past 24 hours)   Comprehensive Metabolic Panel    Collection Time: 05/23/25  9:21 PM    Specimen: Blood   Result Value Ref Range    Glucose 112 (H) 65 - 99 mg/dL    BUN 20 8 - 23 mg/dL    Creatinine 1.05 (H) 0.57 - 1.00 mg/dL    Sodium 136 136 - 145 mmol/L    Potassium 4.5 3.5 - 5.2 mmol/L    Chloride 102 98 - 107 mmol/L    CO2 24.4 22.0 - 29.0 mmol/L    Calcium 9.6 8.6 - 10.5 mg/dL    Total Protein 6.8 6.0 - 8.5 g/dL    Albumin 4.0 3.5 - 5.2 g/dL    ALT (SGPT) 26 1 - 33 U/L    AST (SGOT) 30 1 - 32 U/L    Alkaline Phosphatase 72 39 - 117 U/L    Total Bilirubin 0.3 0.0 - 1.2 mg/dL    Globulin 2.8 gm/dL    A/G Ratio 1.4 g/dL    BUN/Creatinine Ratio 19.0 7.0 - 25.0    Anion Gap 9.6 5.0 - 15.0 mmol/L    eGFR 56.2 (L) >60.0 mL/min/1.73   CBC Auto Differential    Collection Time: 05/23/25  9:21 PM    Specimen: Blood   Result Value Ref Range    WBC 7.18 3.40 - 10.80 10*3/mm3    RBC 3.95 3.77 - 5.28 10*6/mm3    Hemoglobin 12.4 12.0 - 15.9 g/dL    Hematocrit 37.2 34.0 - 46.6 %    MCV 94.2 79.0 - 97.0 fL    MCH 31.4 26.6 - 33.0 pg    MCHC 33.3 31.5 - 35.7 g/dL    RDW 14.0 12.3 - 15.4 %    RDW-SD 48.2 37.0 - 54.0 fl    MPV 10.0 6.0 - 12.0 fL    Platelets 175 140 - 450 10*3/mm3    Neutrophil % 70.5 42.7 - 76.0 %    Lymphocyte % 18.8 (L) 19.6 - 45.3 %    Monocyte % 7.9 5.0 - 12.0 %    Eosinophil % 1.7 0.3 - 6.2 %    Basophil % 0.8 0.0 - 1.5 %    Immature Grans % 0.3 0.0 - 0.5 %    Neutrophils, Absolute 5.06 1.70 - 7.00 10*3/mm3    Lymphocytes, Absolute 1.35 0.70 - 3.10 10*3/mm3    Monocytes, Absolute 0.57 0.10 - 0.90 10*3/mm3    Eosinophils, Absolute 0.12 0.00 - 0.40 10*3/mm3    Basophils, Absolute 0.06 0.00 - 0.20 10*3/mm3    Immature Grans, Absolute 0.02 0.00 - 0.05 10*3/mm3    nRBC 0.0 0.0 - 0.2 /100  WBC   Hemoglobin A1c    Collection Time: 05/23/25  9:21 PM    Specimen: Blood   Result Value Ref Range    Hemoglobin A1C 7.40 (H) 4.80 - 5.60 %       Ordered the above labs and independently reviewed and interpreted the results.        RADIOLOGY  XR Knee 1 or 2 View Right  Result Date: 5/23/2025  XR KNEE 1 OR 2 VW RIGHT-5/23/2025  HISTORY: Right knee pain.  There is mild tibiofemoral joint space narrowing most pronounced medially. Hypertrophic changes are seen in the patella along the nonarticular anterior and superior aspects. There is some sclerosis in the distal shaft of the femur likely healed infarct or enchondroma and likely benign. There is some popliteal artery calcification.  No fractures are seen.      1. Degenerative changes of the right knee. 2. No acute bony abnormality is seen.         I ordered the above noted radiological studies. Independently reviewed and interpreted by me.  See dictation for official radiology interpretation.      PROCEDURES    Procedures      MEDICATIONS GIVEN IN ER    Medications   sodium chloride 0.9 % flush 10 mL (has no administration in time range)   sodium chloride 0.9 % flush 10 mL (has no administration in time range)   sodium chloride 0.9 % flush 10 mL (has no administration in time range)   sodium chloride 0.9 % infusion 40 mL (has no administration in time range)   ondansetron ODT (ZOFRAN-ODT) disintegrating tablet 4 mg (has no administration in time range)     Or   ondansetron (ZOFRAN) injection 4 mg (has no administration in time range)   morphine injection 4 mg (4 mg Intravenous Given 5/23/25 2125)   ondansetron (ZOFRAN) injection 4 mg (4 mg Intravenous Given 5/23/25 2125)         PROGRESS, DATA ANALYSIS, CONSULTS, AND MEDICAL DECISION MAKING    All labs have been independently reviewed and interpreted by me.  All radiology studies have been independently reviewed and interpreted by me and discussed with radiologist dictating the report.   EKG's independently  reviewed and interpreted by me.  Discussion below represents my analysis of pertinent findings related to patient's condition, differential diagnosis, treatment plan and final disposition.    Differential diagnosis: Fracture, dislocation, ligament injury, meniscal tear, tendinopathy    ED Course as of 05/23/25 2330   Fri May 23, 2025   2119 XR Knee 1 or 2 View Right  I reviewed patient's xray image(s), no acute fracture, interpreted by self  I reviewed the radiologist's interpretation of above image(s)   [DN]   2205 Discussed case with ELAINE King, who will admit the patient. [DC]      ED Course User Index  [DC] Chloé Dean PA  [DN] Riccardo Rodriguez MD             AS OF 23:30 EDT VITALS:    BP - 141/70  HR - 88  TEMP - 97.8 °F (36.6 °C) (Oral)  O2 SATS - 93%        DIAGNOSIS  Final diagnoses:   Acute pain of right knee   Unable to bear weight on right lower extremity         DISPOSITION  ED Disposition       ED Disposition   Decision to Admit    Condition   --    Comment   --                  Note Disclaimer: At [facility], we believe that sharing information builds trust and better relationships. You are receiving this note because you recently visited [facility]. It is possible you will see health information before a provider has talked with you about it. This kind of information can be easy to misunderstand. To help you fully understand what it means for your health, we urge you to discuss this note with your provider.         Chloé Dean PA  05/23/25 3833

## 2025-05-25 VITALS
SYSTOLIC BLOOD PRESSURE: 131 MMHG | RESPIRATION RATE: 18 BRPM | BODY MASS INDEX: 31.47 KG/M2 | HEART RATE: 64 BPM | HEIGHT: 62 IN | WEIGHT: 171 LBS | OXYGEN SATURATION: 95 % | TEMPERATURE: 97.5 F | DIASTOLIC BLOOD PRESSURE: 62 MMHG

## 2025-05-25 LAB — GLUCOSE BLDC GLUCOMTR-MCNC: 101 MG/DL (ref 70–130)

## 2025-05-25 PROCEDURE — G0378 HOSPITAL OBSERVATION PER HR: HCPCS

## 2025-05-25 PROCEDURE — 82948 REAGENT STRIP/BLOOD GLUCOSE: CPT

## 2025-05-25 RX ORDER — TRAMADOL HYDROCHLORIDE 50 MG/1
50 TABLET ORAL EVERY 12 HOURS PRN
Qty: 14 TABLET | Refills: 0 | Status: SHIPPED | OUTPATIENT
Start: 2025-05-25

## 2025-05-25 RX ADMIN — PIOGLITAZONE HYDROCHLORIDE 30 MG: 30 TABLET ORAL at 08:10

## 2025-05-25 RX ADMIN — ISOSORBIDE MONONITRATE 15 MG: 30 TABLET, EXTENDED RELEASE ORAL at 08:10

## 2025-05-25 RX ADMIN — Medication 10 ML: at 08:11

## 2025-05-25 RX ADMIN — LEVOTHYROXINE SODIUM 50 MCG: 50 TABLET ORAL at 06:43

## 2025-05-25 NOTE — DISCHARGE SUMMARY
ED OBSERVATION PROGRESS/DISCHARGE SUMMARY    Date of Admission: 5/23/2025   LOS: 0 days   PCP: Alena Paz MD    Final Diagnosis right knee pain, probable meniscal tear      Subjective     Hospital Outcome:   Debbie Carrasquillo is a 74 y.o. female who was admitted to the ED observation unit for further evaluation of right knee pain.  Patient reports she was standing at her sink tonight turned to walk away and felt a sharp sudden onset of severe pain in the posterior lateral aspect of the right knee and has been unable to bend her knee or bear weight since time of injury.  Patient had an ultrasound yesterday and primary care showing no DVT.  In the ED patient had plain films of the right knee that showed degenerative changes but no acute bony abnormality.  MRI of the right knee without contrast is pending.     10:30 AM.  Orthopedics has evaluated the patient.  They viewed the MRI and there appears to be very minimal effusion and likely a nondisplaced lateral meniscal tear.  No evidence of microtrabecular stress fracture.  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  Ortho recommends pain control, knee immobilizer, walker.  It is felt she may be a candidate for intra-articular corticosteroid injection that can be arranged outpatient if she fails to improve.     2:28 PM.  MRI results are still pending.  MRI completed at 4:40 AM.  Order was placed stat.  I placed a call to film library to investigate and ensure this is on someone's list to be read.     I also reevaluated the patient.  She is able to walk with a walker and knee immobilizer.  Physical therapy wants her to have home PT.  [facility] is cleared the patient for discharge reports her daughter will be available to take care of her.  Due to logistics patient would like to remain overnight where transport home can be better arranged tomorrow.  Patient has only been in the ED observation unit for 17 hours.  This seems a reasonable request and we  will count on discharge tomorrow.     5/25/25:  No acute events overnight. Vital signs stable.  She has been seen and evaluated by [facility], patient plans to obtain a shower chair from online for home use.  Agreeable to discharge home and outpatient follow-up with orthopedics for steroid joint injection.  Advised by radiology service unfortunately patient's MRI will not be able to be read until Tuesday, no [facility] radiologist on-call this weekend.    ROS:  General: no fevers, chills  Respiratory: no cough, dyspnea  Cardiovascular: no chest pain, palpitations  Abdomen: No abdominal pain, nausea, vomiting, or diarrhea  Neurologic: No focal weakness    Objective   Physical Exam:  I have reviewed the vital signs.  Temp:  [97.1 °F (36.2 °C)-97.5 °F (36.4 °C)] 97.3 °F (36.3 °C)  Heart Rate:  [63-76] 63  Resp:  [16-18] 18  BP: (108-138)/(53-65) 129/64  General Appearance:    Alert, cooperative, no distress  Head:    Normocephalic, atraumatic  Eyes:    Sclerae anicteric  Neck:   Supple, no mass  Lungs: Clear to auscultation bilaterally, respirations unlabored  Heart: Regular rate and rhythm, S1 and S2 normal, no murmur, rub or gallop  Abdomen:  Soft, nontender, bowel sounds active, obese  Extremities: No clubbing, cyanosis, or edema to lower extremities  Pulses:  2+ and symmetric in distal lower extremities  Skin: No rashes   Neurologic: Oriented x3, Normal strength to extremities    Results Review:    I have reviewed the labs, radiology results and diagnostic studies.    Results from last 7 days   Lab Units 05/23/25  2121   WBC 10*3/mm3 7.18   HEMOGLOBIN g/dL 12.4   HEMATOCRIT % 37.2   PLATELETS 10*3/mm3 175     Results from last 7 days   Lab Units 05/23/25  2121   SODIUM mmol/L 136   POTASSIUM mmol/L 4.5   CHLORIDE mmol/L 102   CO2 mmol/L 24.4   BUN mg/dL 20   CREATININE mg/dL 1.05*   CALCIUM mg/dL 9.6   BILIRUBIN mg/dL 0.3   ALK PHOS U/L 72   ALT (SGPT) U/L 26   AST (SGOT) U/L 30   GLUCOSE mg/dL 112*     Imaging Results (Last 24  Hours)       ** No results found for the last 24 hours. **            I have reviewed the medications.     Discharge Medications        New Medications        Instructions Start Date   traMADol 50 MG tablet  Commonly known as: ULTRAM   50 mg, Oral, Every 12 Hours PRN             Continue These Medications        Instructions Start Date   aspirin 325 MG EC tablet   325 mg, Daily      atorvastatin 40 MG tablet  Commonly known as: LIPITOR   40 mg, Oral, Daily      isosorbide mononitrate 30 MG 24 hr tablet  Commonly known as: IMDUR   15 mg, Oral, Daily      nitroglycerin 0.4 MG SL tablet  Commonly known as: NITROSTAT   0.4 mg, Sublingual, Every 5 Minutes PRN, Take no more than 3 doses in 15 minutes.      pioglitazone 30 MG tablet  Commonly known as: ACTOS   30 mg, Daily      Trulicity 0.75 MG/0.5ML solution pen-injector  Generic drug: Dulaglutide   sundays             ASK your doctor about these medications        Instructions Start Date   levothyroxine 50 MCG tablet  Commonly known as: SYNTHROID, LEVOTHROID   50 mcg, Daily              ---------------------------------------------------------------------------------------------  Assessment & Plan   Assessment/Problem List    Knee pain, acute      Plan:       Right knee pain  MRI of the right knee is pending  PT is cleared for discharge home with outpatient PT  CCP has cleared for discharge  Ortho recommends knee immobilizer, walker, weightbearing as tolerated  If failure to improve follow-up outpatient for corticosteroid injection     Diabetes  Hemoglobin A1c 7.40  Continue Actos.  Diabetic diet     Hypertension  Continue Imdur     Hypothyroidism  Continue levothyroxine       Disposition: Home    Follow-up after Discharge: PCP, PT, orthopedics    This note will serve as a discharge summary    Christine Stevenson, APRN 05/25/25 05:31 EDT    I have worn appropriate PPE during this patient encounter, sanitized my hands both with entering and exiting patient's room.      35  minutes has been spent by [facility] providers in the care of this patient while under observation status on this date 05/25/25

## 2025-05-25 NOTE — PROGRESS NOTES
MD Attestation Note    SHARED VISIT: This visit was performed by BOTH a physician and an APC. The substantive portion of the medical decision making was performed by this attesting physician who made or approved the management plan and takes responsibility for patient management. All studies in the APC note (if performed) were independently interpreted by me.       My personal findings are:        Subjective:  Patient admitted for acute right knee pain.  She states that she is actually feeling much better today.  She has been up ambulating with a knee immobilizer on with the assistance of a walker.  She feels comfortable going home.        Objective:  GENERAL: Awake, alert, in no acute distress.  Sitting up in bedside chair.  HENT:  Normocephalic, atraumatic. Nares patent.   EYES: Pupils equal, reactive. Extraocular movements normal.   NEUROLOGIC: Cranial nerves II-XII normal. Motor strength 5/5 in extremities.   EXTREMITIES: Right lower extremity immobilized in knee immobilizer.  SKIN:  No rash, normal to inspection.          Assessment/ Plan:  Acute right knee pain  Plain films of the right knee demonstrate no acute fracture  MRI of the right knee is pending formal radiology read which apparently will not be available until Tuesday, however Dr. Chamorro with orthopedic surgery has reviewed this and thinks if anything she may have a mild lateral meniscus injury but nothing warranting urgent surgical intervention and nothing that would prevent her from weightbearing as tolerated right lower extremity.  Continue knee immobilizer right lower extremity  Walker will be provided at discharge  Onesimo reviewed, tramadol as needed for pain, Tylenol scheduled every 8 hours  Follow-up with Dr. Chamorro as outpatient for consideration of intra-articular steroid injection  Outpatient PT

## 2025-05-25 NOTE — CASE MANAGEMENT/SOCIAL WORK
Discharge Planning Assessment  [facility]     Patient Name: Debbie Carrasquillo  MRN: [id number]  Today's Date: 5/25/2025    Admit Date: 5/23/2025    Plan: plans to return to her home, daughter to provide transportation,DME walker provided to pt for ambulation/mobility.   Discharge Needs Assessment    No documentation.                  Discharge Plan       Row Name 05/25/25 0941       Plan    Plan Comments Advised by provider that the patient is requesting a shower chair. Advised the patient that shower chiar is not a covered item under Medicare at this time. Provided the patient with online options such as [employer] and [employer] that offer shower chairs/bench seats.  Pt verbalized understanding of where she could obtain a shower chair.  No further needs identfied by the patient at this time.  Her family will provide transport to her home,and will assist in obtaining this shower chair.  REYNA Nunes Rn [facility]                  Continued Care and Services - Admitted Since 5/23/2025       Durable Medical Equipment       Service Provider Request Status Services Address Phone Fax Patient Preferred    [facility] Pending - Request Sent -- [address] [place] [address] [phone] --       Internal Comment last updated by Nargis Nunes RN 5/24/2025 1122    Placed call to Suzanne with [employer] [phone].                             Expected Discharge Date and Time       Expected Discharge Date Expected Discharge Time    May 25, 2025            Demographic Summary    No documentation.                  Functional Status    No documentation.                  Psychosocial    No documentation.                  Abuse/Neglect    No documentation.                  Legal    No documentation.                  Substance Abuse    No documentation.                  Patient Forms    No documentation.                     Nargis Nunes, SIDDHARTHA

## 2025-05-25 NOTE — PLAN OF CARE
Goal Outcome Evaluation:              Outcome Evaluation: Pt without complaints overnight. Knee immobilizer to RLE with ambulation. Ortho following. Likely discharge home today.

## 2025-06-03 ENCOUNTER — OFFICE VISIT (OUTPATIENT)
Dept: CARDIOLOGY | Facility: CLINIC | Age: 75
End: 2025-06-03
Payer: MEDICARE

## 2025-06-03 VITALS
HEART RATE: 74 BPM | BODY MASS INDEX: 32.39 KG/M2 | HEIGHT: 62 IN | SYSTOLIC BLOOD PRESSURE: 106 MMHG | WEIGHT: 176 LBS | DIASTOLIC BLOOD PRESSURE: 68 MMHG

## 2025-06-03 DIAGNOSIS — I25.10 CORONARY ARTERY DISEASE INVOLVING NATIVE CORONARY ARTERY OF NATIVE HEART WITHOUT ANGINA PECTORIS: Primary | ICD-10-CM

## 2025-06-03 DIAGNOSIS — E78.5 HYPERLIPIDEMIA, UNSPECIFIED HYPERLIPIDEMIA TYPE: ICD-10-CM

## 2025-06-03 DIAGNOSIS — I10 HYPERTENSION, UNSPECIFIED TYPE: ICD-10-CM

## 2025-06-03 PROCEDURE — 93000 ELECTROCARDIOGRAM COMPLETE: CPT

## 2025-06-03 PROCEDURE — 99213 OFFICE O/P EST LOW 20 MIN: CPT

## 2025-06-03 RX ORDER — ONDANSETRON 4 MG/1
TABLET, ORALLY DISINTEGRATING ORAL
COMMUNITY
Start: 2025-02-28

## 2025-06-03 NOTE — PROGRESS NOTES
Subjective:        Debbie Carrasquillo is a 74 y.o. female who here for follow up    Chief Complaint   Patient presents with    Follow-up     1YR        HPI:  This is a 74 year old female with hypertension hyperlipidemia, coronary artery disease, diabetes mellitus. She follows up in office today for annual follow up. She reports feeling well, no chest pain or shortness of breath.     Echo 5/14/24 EF 61-65%, normal LV function. Stress test 5/14/24 was a normal myocardial perfusion study with no evidence of ischemia. Cath in 2018 normal left main. LAD minimum irregularity including diagonal and  branches at the ostial level 30-40% stenosis. Lcx dominant with minimum diffuse irregularity. RCA nondominant with ostial 70% stenosis.     The following portions of the patient's history were reviewed and updated as appropriate: allergies, current medications, past family history, past medical history, past social history, past surgical history and problem list.    Past Medical History:   Diagnosis Date    Cardiac abnormality     stent placment    Diabetes mellitus     Hyperlipidemia     Hypertension          reports that she has quit smoking. She has never used smokeless tobacco. She reports current alcohol use of about 1.0 standard drink of alcohol per week. She reports that she does not use drugs.     Family History   Problem Relation Age of Onset    Heart disease Mother     Heart disease Sister     Heart disease Brother           Objective:           Vitals and nursing note reviewed.   Constitutional:       Appearance: Well-developed.   HENT:      Head: Normocephalic.      Right Ear: External ear normal.      Left Ear: External ear normal.   Neck:      Vascular: No JVD.   Pulmonary:      Effort: Pulmonary effort is normal. No respiratory distress.      Breath sounds: Normal breath sounds. No stridor. No rales.   Cardiovascular:      Normal rate. Regular rhythm.      No gallop.    Pulses:     Intact distal pulses.    Edema:     Peripheral edema absent.   Abdominal:      General: Bowel sounds are normal. There is no distension.      Palpations: Abdomen is soft.      Tenderness: There is no abdominal tenderness. There is no guarding.   Musculoskeletal: Normal range of motion.         General: No tenderness.      Cervical back: Normal range of motion. Skin:     General: Skin is warm.   Neurological:      Mental Status: Alert and oriented to person, place, and time.      Deep Tendon Reflexes: Reflexes are normal and symmetric.   Psychiatric:         Judgment: Judgment normal.           ECG 12 Lead    Date/Time: 6/3/2025 1:36 PM  Performed by: Judy Mcgregor APRN    Authorized by: Judy Mcgregor APRN  Comparison: compared with previous ECG from 5/24/2024  Similar to previous ECG  Rhythm: sinus rhythm  Rate: normal  BPM: 72    Clinical impression: normal ECG        Conclusion       Successful right and left coronary angiogram and LV gram  Normal left main  Left ventricular descending minimum irregularity including the diagonal and  branches at the ostial level 30-40% stenosis  Circumflex artery was a dominant with minimum diffuse irregularity  Right coronary artery was a nondominant with ostial 70% stenosis  Normal LV gram    Interpretation Summary         Findings consistent with an abnormal ECG stress test.    Myocardial perfusion imaging indicates a normal myocardial perfusion study with no evidence of ischemia. Impressions are consistent with a low risk study.    Left ventricular ejection fraction is normal (Calculated EF = 70%).    Interpretation Summary         Left ventricular ejection fraction appears to be 61 - 65%.    Left ventricular diastolic function was normal.    Estimated right ventricular systolic pressure from tricuspid regurgitation is normal (<35 mmHg).         Current Outpatient Medications:     aspirin  MG tablet, Take 1 tablet by mouth Daily., Disp: , Rfl:     isosorbide mononitrate  (IMDUR) 30 MG 24 hr tablet, Take 0.5 tablets by mouth Daily., Disp: 15 tablet, Rfl: 6    levothyroxine (SYNTHROID, LEVOTHROID) 50 MCG tablet, Take 1 tablet by mouth Daily., Disp: , Rfl:     nitroglycerin (NITROSTAT) 0.4 MG SL tablet, Place 1 tablet under the tongue Every 5 (Five) Minutes As Needed for Chest Pain. Take no more than 3 doses in 15 minutes., Disp: 25 tablet, Rfl: 12    ondansetron ODT (ZOFRAN-ODT) 4 MG disintegrating tablet, dissolve 1 tablet in mouth every 8 hours as needed for nausea, Disp: , Rfl:     pioglitazone (ACTOS) 30 MG tablet, Take 1 tablet by mouth Daily., Disp: , Rfl:     atorvastatin (LIPITOR) 40 MG tablet, Take 1 tablet by mouth Daily. (Patient not taking: Reported on 6/3/2025), Disp: , Rfl:      Assessment:        Patient Active Problem List   Diagnosis    Chest pain    Abnormal electrocardiogram    Shortness of breath    Knee pain, acute               Plan:   Coronary artery disease: no chest pain, continue aspirin, atorvastatin, imdur    Hypertension: Bp great, continue current management, imdur    Importance of controlling hypertension and blood pressure  checkup on the regular basis has been explained. Hypertension as a silent killer has been discussed. Risk reduction of the weight and regular exercises to control the hypertension has been explained.    Hyperlipidemia: she reports her pcp manages her cholesterol and labs, off statin due to myalgias    Risk of the hyperlipidemia, importance of the treatment has been explained. Pros and cons of the statins has been explained. Regular blood workup as well as side effects including the liver failure, myelopathy death has been explained.                   COUNSELING:darrell Chavira was given to patient for the following topics: diagnostic results, risk factor reductions, impressions, risks and benefits of treatment options and importance of treatment compliance .       SMOKING COUNSELING:denies    Follow up in 1 year or  sooner if needed    Sincerely,   TREVOR Sainz  [facility]  06/03/25  13:22 EDT    EMR Dragon/Transcription disclaimer:   Much of this encounter note is an electronic transcription/translation of spoken language to printed text. The electronic translation of spoken language may permit erroneous, or at times, nonsensical words or phrases to be inadvertently transcribed; Although I have reviewed the note for such errors, some may still exist.

## (undated) DEVICE — CATH DIAG IMPULSE FR4 5F 100CM

## (undated) DEVICE — GLIDESHEATH BASIC HYDROPHILIC COATED INTRODUCER SHEATH: Brand: GLIDESHEATH

## (undated) DEVICE — PK CATH CARD 40

## (undated) DEVICE — CATH VENT MIV RADL PIG ST TIP 4F 110CM

## (undated) DEVICE — CATH DIAG IMPULSE FL3.5 5F 100CM

## (undated) DEVICE — KT MANIFLD CARDIAC

## (undated) DEVICE — GW EMR FIX EXCHG J STD .035 3MM 260CM